# Patient Record
Sex: FEMALE | Race: WHITE | NOT HISPANIC OR LATINO
[De-identification: names, ages, dates, MRNs, and addresses within clinical notes are randomized per-mention and may not be internally consistent; named-entity substitution may affect disease eponyms.]

---

## 2018-04-01 ENCOUNTER — MOBILE ON CALL (OUTPATIENT)
Age: 28
End: 2018-04-01

## 2018-04-03 ENCOUNTER — APPOINTMENT (OUTPATIENT)
Dept: PULMONOLOGY | Facility: CLINIC | Age: 28
End: 2018-04-03
Payer: MEDICAID

## 2018-04-03 VITALS
DIASTOLIC BLOOD PRESSURE: 70 MMHG | OXYGEN SATURATION: 96 % | SYSTOLIC BLOOD PRESSURE: 110 MMHG | TEMPERATURE: 98.1 F | WEIGHT: 206 LBS | HEART RATE: 47 BPM | BODY MASS INDEX: 40.44 KG/M2 | RESPIRATION RATE: 15 BRPM | HEIGHT: 60 IN

## 2018-04-03 VITALS — BODY MASS INDEX: 41.23 KG/M2 | WEIGHT: 210 LBS | HEIGHT: 60 IN

## 2018-04-03 DIAGNOSIS — F17.200 NICOTINE DEPENDENCE, UNSPECIFIED, UNCOMPLICATED: ICD-10-CM

## 2018-04-03 DIAGNOSIS — J30.1 ALLERGIC RHINITIS DUE TO POLLEN: ICD-10-CM

## 2018-04-03 PROCEDURE — 94726 PLETHYSMOGRAPHY LUNG VOLUMES: CPT | Mod: GC

## 2018-04-03 PROCEDURE — ZZZZZ: CPT

## 2018-04-03 PROCEDURE — 99203 OFFICE O/P NEW LOW 30 MIN: CPT | Mod: 25,GC

## 2018-04-03 PROCEDURE — 94729 DIFFUSING CAPACITY: CPT | Mod: GC

## 2018-04-03 PROCEDURE — 94060 EVALUATION OF WHEEZING: CPT | Mod: GC

## 2018-04-03 RX ORDER — FLUTICASONE PROPIONATE AND SALMETEROL 50; 250 UG/1; UG/1
250-50 POWDER RESPIRATORY (INHALATION) TWICE DAILY
Qty: 1 | Refills: 5 | Status: DISCONTINUED | COMMUNITY
Start: 2018-04-03 | End: 2018-04-03

## 2018-04-03 RX ORDER — CETIRIZINE HYDROCHLORIDE 10 MG/1
10 CAPSULE, LIQUID FILLED ORAL
Refills: 0 | Status: ACTIVE | COMMUNITY
Start: 2018-04-03

## 2018-05-07 ENCOUNTER — RX RENEWAL (OUTPATIENT)
Age: 28
End: 2018-05-07

## 2018-06-03 ENCOUNTER — RX RENEWAL (OUTPATIENT)
Age: 28
End: 2018-06-03

## 2018-08-17 ENCOUNTER — APPOINTMENT (OUTPATIENT)
Dept: PULMONOLOGY | Facility: CLINIC | Age: 28
End: 2018-08-17

## 2018-09-28 ENCOUNTER — MEDICATION RENEWAL (OUTPATIENT)
Age: 28
End: 2018-09-28

## 2018-10-16 ENCOUNTER — APPOINTMENT (OUTPATIENT)
Dept: PULMONOLOGY | Facility: CLINIC | Age: 28
End: 2018-10-16
Payer: COMMERCIAL

## 2018-10-16 VITALS
RESPIRATION RATE: 16 BRPM | TEMPERATURE: 98.3 F | BODY MASS INDEX: 37.3 KG/M2 | SYSTOLIC BLOOD PRESSURE: 119 MMHG | HEIGHT: 60 IN | DIASTOLIC BLOOD PRESSURE: 74 MMHG | OXYGEN SATURATION: 96 % | HEART RATE: 81 BPM | WEIGHT: 190 LBS

## 2018-10-16 DIAGNOSIS — R06.02 SHORTNESS OF BREATH: ICD-10-CM

## 2018-10-16 PROCEDURE — 99214 OFFICE O/P EST MOD 30 MIN: CPT | Mod: GC

## 2018-12-31 ENCOUNTER — RX RENEWAL (OUTPATIENT)
Age: 28
End: 2018-12-31

## 2019-03-30 NOTE — CONSULT LETTER
[Hebert Sandoval M.D.] : Hebert Sandoval M.D. [Danie Leonard Professor of Medicine] : Danie Butcher of Medicine [[facility] [employer] [facility]] : [facility] [Division of Pulmonary, Critical Care and Sleep Medicine] : Division of Pulmonary, Critical Care and Sleep Medicine

## 2019-04-01 ENCOUNTER — APPOINTMENT (OUTPATIENT)
Dept: PULMONOLOGY | Facility: CLINIC | Age: 29
End: 2019-04-01
Payer: MEDICAID

## 2019-04-01 VITALS
BODY MASS INDEX: 37.3 KG/M2 | SYSTOLIC BLOOD PRESSURE: 110 MMHG | HEIGHT: 60 IN | WEIGHT: 190 LBS | DIASTOLIC BLOOD PRESSURE: 70 MMHG | RESPIRATION RATE: 16 BRPM | TEMPERATURE: 98 F | HEART RATE: 94 BPM | OXYGEN SATURATION: 100 %

## 2019-04-01 DIAGNOSIS — E66.9 OBESITY, UNSPECIFIED: ICD-10-CM

## 2019-04-01 DIAGNOSIS — J45.909 UNSPECIFIED ASTHMA, UNCOMPLICATED: ICD-10-CM

## 2019-04-01 PROCEDURE — 99213 OFFICE O/P EST LOW 20 MIN: CPT

## 2019-04-01 NOTE — HISTORY OF PRESENT ILLNESS
[FreeTextEntry1] : 27yo female patient current smoker with asthma here for f/u.  Patient currently maintained on Symbicort 160-4.5 and PRN Ventolin.  She knows her asthma triggers, mainly to be environmental, and so she takes Zyrtec during the spring time.  She has remained well in the interim, and today denies respiratory symptoms.  Unfortunately she continues to smoke, but admits that she has cut down on the amount of daily cigarettes.\par \par

## 2019-04-01 NOTE — DISCUSSION/SUMMARY
[FreeTextEntry1] : 27yo female patient current smoker with asthma here for f/u.  Patient's lung functions evidence of obstructive airways disease with bronchodilator response.  Her asthma appears to be controlled at this time.  She will c/w her Symbicort 160-4.5 BID and Ventolin PRN.  Reinforced daily peak flow monitoring (goal 300 to 450) to monitor asthma control.  Strongly encouraged smoking cessation.  Increase physical activity as tolerated.  F/u in 6 months, if not sooner.

## 2019-04-01 NOTE — PHYSICAL EXAM
[General Appearance - In No Acute Distress] : no acute distress [Normal Conjunctiva] : the conjunctiva exhibited no abnormalities [Normal Oropharynx] : normal oropharynx [III] : III [Heart Rate And Rhythm] : heart rate and rhythm were normal [Heart Sounds] : normal S1 and S2 [Arterial Pulses Normal] : the arterial pulses were normal [Respiration, Rhythm And Depth] : normal respiratory rhythm and effort [Abnormal Walk] : normal gait [Nail Clubbing] : no clubbing of the fingernails [Cyanosis, Localized] : no localized cyanosis [No Focal Deficits] : no focal deficits [Skin Color & Pigmentation] : normal skin color and pigmentation [Normal Appearance] : normal appearance [Neck Appearance] : the appearance of the neck was normal [Exaggerated Use Of Accessory Muscles For Inspiration] : no accessory muscle use [Auscultation Breath Sounds / Voice Sounds] : lungs were clear to auscultation bilaterally [] : no rash [Affect] : the affect was normal [Mood] : the mood was normal [FreeTextEntry2] : none

## 2019-04-01 NOTE — REVIEW OF SYSTEMS
[Negative] : Sleep Disorder [Orthopnea] : no orthopnea [Urgency] : no feelings of urinary urgency [Anxiety] : no anxiety [de-identified] : seasonal allergies

## 2019-05-15 ENCOUNTER — RX RENEWAL (OUTPATIENT)
Age: 29
End: 2019-05-15

## 2019-08-19 ENCOUNTER — MEDICATION RENEWAL (OUTPATIENT)
Age: 29
End: 2019-08-19

## 2019-10-14 ENCOUNTER — APPOINTMENT (OUTPATIENT)
Dept: PULMONOLOGY | Facility: CLINIC | Age: 29
End: 2019-10-14

## 2020-04-21 RX ORDER — FLUTICASONE FUROATE AND VILANTEROL TRIFENATATE 200; 25 UG/1; UG/1
200-25 POWDER RESPIRATORY (INHALATION)
Qty: 1 | Refills: 11 | Status: DISCONTINUED | COMMUNITY
Start: 2020-03-10 | End: 2020-04-21

## 2020-04-21 RX ORDER — BUDESONIDE AND FORMOTEROL FUMARATE DIHYDRATE 160; 4.5 UG/1; UG/1
160-4.5 AEROSOL RESPIRATORY (INHALATION)
Qty: 10.2 | Refills: 6 | Status: DISCONTINUED | COMMUNITY
Start: 2018-04-03 | End: 2020-04-21

## 2020-05-26 RX ORDER — FLUTICASONE PROPIONATE AND SALMETEROL 250; 50 UG/1; UG/1
250-50 POWDER RESPIRATORY (INHALATION) TWICE DAILY
Qty: 1 | Refills: 11 | Status: DISCONTINUED | COMMUNITY
Start: 2020-04-21 | End: 2020-05-26

## 2021-06-01 ENCOUNTER — NON-APPOINTMENT (OUTPATIENT)
Age: 31
End: 2021-06-01

## 2021-06-29 ENCOUNTER — NON-APPOINTMENT (OUTPATIENT)
Age: 31
End: 2021-06-29

## 2024-02-02 ENCOUNTER — APPOINTMENT (OUTPATIENT)
Dept: OBGYN | Facility: CLINIC | Age: 34
End: 2024-02-02
Payer: MEDICAID

## 2024-02-02 VITALS
BODY MASS INDEX: 37.76 KG/M2 | SYSTOLIC BLOOD PRESSURE: 130 MMHG | WEIGHT: 200 LBS | DIASTOLIC BLOOD PRESSURE: 70 MMHG | HEIGHT: 61 IN

## 2024-02-02 DIAGNOSIS — Z78.9 OTHER SPECIFIED HEALTH STATUS: ICD-10-CM

## 2024-02-02 DIAGNOSIS — Z82.49 FAMILY HISTORY OF ISCHEMIC HEART DISEASE AND OTHER DISEASES OF THE CIRCULATORY SYSTEM: ICD-10-CM

## 2024-02-02 DIAGNOSIS — Z80.0 FAMILY HISTORY OF MALIGNANT NEOPLASM OF DIGESTIVE ORGANS: ICD-10-CM

## 2024-02-02 DIAGNOSIS — Z3A.10 10 WEEKS GESTATION OF PREGNANCY: ICD-10-CM

## 2024-02-02 DIAGNOSIS — O34.219 MATERNAL CARE FOR UNSPECIFIED TYPE SCAR FROM PREVIOUS CESAREAN DELIVERY: ICD-10-CM

## 2024-02-02 DIAGNOSIS — O02.1 MISSED ABORTION: ICD-10-CM

## 2024-02-02 DIAGNOSIS — O36.80X0 PREGNANCY WITH INCONCLUSIVE FETAL VIABILITY, NOT APPLICABLE OR UNSPECIFIED: ICD-10-CM

## 2024-02-02 DIAGNOSIS — Z83.3 FAMILY HISTORY OF DIABETES MELLITUS: ICD-10-CM

## 2024-02-02 PROCEDURE — 76801 OB US < 14 WKS SINGLE FETUS: CPT

## 2024-02-02 PROCEDURE — 0501F PRENATAL FLOW SHEET: CPT

## 2024-02-02 RX ORDER — ASCORBIC ACID, CHOLECALCIFEROL, .ALPHA.-TOCOPHEROL ACETATE, DL-, PYRIDOXINE, FOLIC ACID, CYANOCOBALAMIN, CALCIUM, FERROUS FUMARATE, MAGNESIUM, DOCONEXENT 85; 200; 10; 25; 1; 12; 140; 27; 45; 300 [IU]/1; [IU]/1; [IU]/1; [IU]/1; MG/1; UG/1; MG/1; MG/1; MG/1; MG/1
27-0.6-0.4-3 CAPSULE, GELATIN COATED ORAL
Qty: 1 | Refills: 11 | Status: ACTIVE | COMMUNITY
Start: 2024-02-02 | End: 1900-01-01

## 2024-02-02 RX ORDER — FLUTICASONE FUROATE AND VILANTEROL TRIFENATATE 200; 25 UG/1; UG/1
200-25 POWDER RESPIRATORY (INHALATION)
Qty: 1 | Refills: 11 | Status: DISCONTINUED | COMMUNITY
Start: 2020-04-20 | End: 2024-02-02

## 2024-02-02 RX ORDER — ALBUTEROL SULFATE 90 UG/1
108 (90 BASE) AEROSOL, METERED RESPIRATORY (INHALATION)
Qty: 1 | Refills: 5 | Status: DISCONTINUED | COMMUNITY
Start: 2018-04-03 | End: 2024-02-02

## 2024-02-02 RX ORDER — BUDESONIDE AND FORMOTEROL FUMARATE DIHYDRATE 160; 4.5 UG/1; UG/1
160-4.5 AEROSOL RESPIRATORY (INHALATION)
Qty: 3 | Refills: 1 | Status: ACTIVE | COMMUNITY
Start: 2020-05-26 | End: 1900-01-01

## 2024-02-02 RX ORDER — ALBUTEROL SULFATE 2.5 MG/3ML
(2.5 MG/3ML) SOLUTION RESPIRATORY (INHALATION)
Qty: 30 | Refills: 2 | Status: ACTIVE | COMMUNITY
Start: 2018-04-03

## 2024-02-02 RX ORDER — ALBUTEROL SULFATE 90 UG/1
108 (90 BASE) INHALANT RESPIRATORY (INHALATION)
Qty: 3 | Refills: 6 | Status: ACTIVE | COMMUNITY
Start: 2019-05-15 | End: 1900-01-01

## 2024-02-03 LAB
BASOPHILS # BLD AUTO: 0.03 K/UL
BASOPHILS NFR BLD AUTO: 0.6 %
BILIRUB UR QL STRIP: NORMAL
EOSINOPHIL # BLD AUTO: 0.11 K/UL
EOSINOPHIL NFR BLD AUTO: 2.2 %
GLUCOSE UR-MCNC: NORMAL
HCG UR QL: 0.2 EU/DL
HCT VFR BLD CALC: 41.1 %
HGB BLD-MCNC: 13.2 G/DL
HGB UR QL STRIP.AUTO: NORMAL
HIV1+2 AB SPEC QL IA.RAPID: NONREACTIVE
IMM GRANULOCYTES NFR BLD AUTO: 0.4 %
KETONES UR-MCNC: NORMAL
LEUKOCYTE ESTERASE UR QL STRIP: NORMAL
LYMPHOCYTES # BLD AUTO: 1.45 K/UL
LYMPHOCYTES NFR BLD AUTO: 28.8 %
MAN DIFF?: NORMAL
MCHC RBC-ENTMCNC: 29.3 PG
MCHC RBC-ENTMCNC: 32.1 GM/DL
MCV RBC AUTO: 91.3 FL
MONOCYTES # BLD AUTO: 0.46 K/UL
MONOCYTES NFR BLD AUTO: 9.1 %
NEUTROPHILS # BLD AUTO: 2.97 K/UL
NEUTROPHILS NFR BLD AUTO: 58.9 %
NITRITE UR QL STRIP: NORMAL
PH UR STRIP: 7
PLATELET # BLD AUTO: 239 K/UL
PROT UR STRIP-MCNC: NORMAL
RBC # BLD: 4.5 M/UL
RBC # FLD: 15.1 %
SP GR UR STRIP: 1
TSH SERPL-ACNC: 3.44 UIU/ML
WBC # FLD AUTO: 5.04 K/UL

## 2024-02-05 LAB
ABO + RH PNL BLD: NORMAL
BACTERIA UR CULT: NORMAL
BLD GP AB SCN SERPL QL: NORMAL
C TRACH RRNA SPEC QL NAA+PROBE: NOT DETECTED
HBV SURFACE AG SERPL QL IA: NONREACTIVE
HCV AB SER QL: NONREACTIVE
HCV S/CO RATIO: 0.36 S/CO
MEV IGG FLD QL IA: 138 AU/ML
MEV IGG+IGM SER-IMP: POSITIVE
N GONORRHOEA RRNA SPEC QL NAA+PROBE: NOT DETECTED
RUBV IGG FLD-ACNC: 2 INDEX
RUBV IGG SER-IMP: POSITIVE
SOURCE AMPLIFICATION: NORMAL
T PALLIDUM AB SER QL IA: NEGATIVE
VZV AB TITR SER: POSITIVE
VZV IGG SER IF-ACNC: 835.9 INDEX

## 2024-02-06 LAB
B19V IGG SER QL IA: 0.21 INDEX
B19V IGG+IGM SER-IMP: NEGATIVE
B19V IGG+IGM SER-IMP: NORMAL
B19V IGM FLD-ACNC: 0.15 INDEX
B19V IGM SER-ACNC: NEGATIVE
HGB A MFR BLD: 97.3 %
HGB A2 MFR BLD: 2.7 %
HGB FRACT BLD-IMP: NORMAL
LEAD BLD-MCNC: <1 UG/DL
SOURCE AMPLIFICATION: NORMAL
T VAGINALIS RRNA SPEC QL NAA+PROBE: NOT DETECTED

## 2024-02-16 ENCOUNTER — NON-APPOINTMENT (OUTPATIENT)
Age: 34
End: 2024-02-16

## 2024-02-22 ENCOUNTER — NON-APPOINTMENT (OUTPATIENT)
Age: 34
End: 2024-02-22

## 2024-02-23 ENCOUNTER — APPOINTMENT (OUTPATIENT)
Dept: OBGYN | Facility: CLINIC | Age: 34
End: 2024-02-23
Payer: MEDICAID

## 2024-02-23 VITALS
DIASTOLIC BLOOD PRESSURE: 70 MMHG | BODY MASS INDEX: 38.33 KG/M2 | HEIGHT: 61 IN | SYSTOLIC BLOOD PRESSURE: 150 MMHG | WEIGHT: 203 LBS

## 2024-02-23 DIAGNOSIS — Z3A.13 13 WEEKS GESTATION OF PREGNANCY: ICD-10-CM

## 2024-02-23 DIAGNOSIS — O20.0 THREATENED ABORTION: ICD-10-CM

## 2024-02-23 LAB
BILIRUB UR QL STRIP: NORMAL
GLUCOSE UR-MCNC: NORMAL
HCG UR QL: 0.2 EU/DL
HGB UR QL STRIP.AUTO: NORMAL
KETONES UR-MCNC: NORMAL
LEUKOCYTE ESTERASE UR QL STRIP: NORMAL
NITRITE UR QL STRIP: NORMAL
PH UR STRIP: 5.5
PROT UR STRIP-MCNC: NORMAL
SP GR UR STRIP: 1.03

## 2024-02-23 PROCEDURE — 0502F SUBSEQUENT PRENATAL CARE: CPT

## 2024-02-23 PROCEDURE — 76815 OB US LIMITED FETUS(S): CPT

## 2024-02-29 ENCOUNTER — NON-APPOINTMENT (OUTPATIENT)
Age: 34
End: 2024-02-29

## 2024-03-19 ENCOUNTER — APPOINTMENT (OUTPATIENT)
Dept: OBGYN | Facility: CLINIC | Age: 34
End: 2024-03-19

## 2024-03-22 ENCOUNTER — APPOINTMENT (OUTPATIENT)
Dept: OBGYN | Facility: CLINIC | Age: 34
End: 2024-03-22
Payer: MEDICAID

## 2024-03-22 VITALS
BODY MASS INDEX: 38.71 KG/M2 | WEIGHT: 205 LBS | DIASTOLIC BLOOD PRESSURE: 70 MMHG | HEIGHT: 61 IN | SYSTOLIC BLOOD PRESSURE: 126 MMHG

## 2024-03-22 DIAGNOSIS — E66.01 MORBID (SEVERE) OBESITY DUE TO EXCESS CALORIES: ICD-10-CM

## 2024-03-22 DIAGNOSIS — N87.0 MILD CERVICAL DYSPLASIA: ICD-10-CM

## 2024-03-22 DIAGNOSIS — Z3A.17 17 WEEKS GESTATION OF PREGNANCY: ICD-10-CM

## 2024-03-22 DIAGNOSIS — Z92.89 PERSONAL HISTORY OF OTHER MEDICAL TREATMENT: ICD-10-CM

## 2024-03-22 LAB
BILIRUB UR QL STRIP: NORMAL
GLUCOSE UR-MCNC: NORMAL
HCG UR QL: 0.2 EU/DL
HGB UR QL STRIP.AUTO: NORMAL
KETONES UR-MCNC: NORMAL
LEUKOCYTE ESTERASE UR QL STRIP: NORMAL
NITRITE UR QL STRIP: NORMAL
PH UR STRIP: 6.5
PROT UR STRIP-MCNC: NORMAL
SP GR UR STRIP: 1.01

## 2024-03-22 PROCEDURE — 0502F SUBSEQUENT PRENATAL CARE: CPT

## 2024-03-25 LAB
AFP MOM: 1.51
AFP VALUE: 50.8 NG/ML
ALPHA FETOPROTEIN SERUM COMMENT: NORMAL
ALPHA FETOPROTEIN SERUM INTERPRETATION: NORMAL
ALPHA FETOPROTEIN SERUM RESULTS: NORMAL
ALPHA FETOPROTEIN SERUM TEST RESULTS: NORMAL
GESTATIONAL AGE BASED ON: NORMAL
GESTATIONAL AGE ON COLLECTION DATE: 17.4 WEEKS
INSULIN DEP DIABETES: NO
MATERNAL AGE AT EDD AFP: 33.9 YR
MULTIPLE GESTATION: NO
OSBR RISK 1 IN: 2661
RACE: NORMAL
WEIGHT AFP: 205 LBS

## 2024-03-27 LAB — CYTOLOGY CVX/VAG DOC THIN PREP: NORMAL

## 2024-04-02 ENCOUNTER — NON-APPOINTMENT (OUTPATIENT)
Age: 34
End: 2024-04-02

## 2024-04-16 ENCOUNTER — APPOINTMENT (OUTPATIENT)
Dept: ANTEPARTUM | Facility: CLINIC | Age: 34
End: 2024-04-16

## 2024-04-23 ENCOUNTER — APPOINTMENT (OUTPATIENT)
Dept: OBGYN | Facility: CLINIC | Age: 34
End: 2024-04-23

## 2024-04-24 ENCOUNTER — ASOB RESULT (OUTPATIENT)
Age: 34
End: 2024-04-24

## 2024-04-24 ENCOUNTER — APPOINTMENT (OUTPATIENT)
Dept: ANTEPARTUM | Facility: CLINIC | Age: 34
End: 2024-04-24

## 2024-04-24 PROCEDURE — 76811 OB US DETAILED SNGL FETUS: CPT | Mod: 1L

## 2024-04-24 PROCEDURE — 76817 TRANSVAGINAL US OBSTETRIC: CPT

## 2024-05-01 ENCOUNTER — NON-APPOINTMENT (OUTPATIENT)
Age: 34
End: 2024-05-01

## 2024-05-01 ENCOUNTER — APPOINTMENT (OUTPATIENT)
Dept: ANTEPARTUM | Facility: CLINIC | Age: 34
End: 2024-05-01
Payer: MEDICAID

## 2024-05-01 ENCOUNTER — ASOB RESULT (OUTPATIENT)
Age: 34
End: 2024-05-01

## 2024-05-01 PROCEDURE — 76816 OB US FOLLOW-UP PER FETUS: CPT

## 2024-05-07 ENCOUNTER — APPOINTMENT (OUTPATIENT)
Dept: OBGYN | Facility: CLINIC | Age: 34
End: 2024-05-07

## 2024-05-14 ENCOUNTER — APPOINTMENT (OUTPATIENT)
Dept: OBGYN | Facility: CLINIC | Age: 34
End: 2024-05-14
Payer: MEDICAID

## 2024-05-14 VITALS
WEIGHT: 209 LBS | BODY MASS INDEX: 39.46 KG/M2 | HEIGHT: 61 IN | SYSTOLIC BLOOD PRESSURE: 130 MMHG | DIASTOLIC BLOOD PRESSURE: 80 MMHG

## 2024-05-14 DIAGNOSIS — O99.210 OBESITY COMPLICATING PREGNANCY, UNSPECIFIED TRIMESTER: ICD-10-CM

## 2024-05-14 DIAGNOSIS — Z3A.25 25 WEEKS GESTATION OF PREGNANCY: ICD-10-CM

## 2024-05-14 LAB
BILIRUB UR QL STRIP: NORMAL
GLUCOSE UR-MCNC: NORMAL
HCG UR QL: 0.2 EU/DL
HGB UR QL STRIP.AUTO: NORMAL
KETONES UR-MCNC: NORMAL
LEUKOCYTE ESTERASE UR QL STRIP: NORMAL
NITRITE UR QL STRIP: NORMAL
PH UR STRIP: 7
PROT UR STRIP-MCNC: NORMAL
SP GR UR STRIP: 1.01

## 2024-05-14 PROCEDURE — 0502F SUBSEQUENT PRENATAL CARE: CPT

## 2024-05-15 ENCOUNTER — NON-APPOINTMENT (OUTPATIENT)
Age: 34
End: 2024-05-15

## 2024-05-15 LAB
BASOPHILS # BLD AUTO: 0.04 K/UL
BASOPHILS NFR BLD AUTO: 0.4 %
EOSINOPHIL # BLD AUTO: 0.1 K/UL
EOSINOPHIL NFR BLD AUTO: 0.9 %
GLUCOSE 1H P 50 G GLC PO SERPL-MCNC: 126 MG/DL
HCT VFR BLD CALC: 36.4 %
HGB BLD-MCNC: 11.7 G/DL
IMM GRANULOCYTES NFR BLD AUTO: 0.7 %
LYMPHOCYTES # BLD AUTO: 1.6 K/UL
LYMPHOCYTES NFR BLD AUTO: 14.6 %
MAN DIFF?: NORMAL
MCHC RBC-ENTMCNC: 30.6 PG
MCHC RBC-ENTMCNC: 32.1 GM/DL
MCV RBC AUTO: 95.3 FL
MONOCYTES # BLD AUTO: 0.68 K/UL
MONOCYTES NFR BLD AUTO: 6.2 %
NEUTROPHILS # BLD AUTO: 8.45 K/UL
NEUTROPHILS NFR BLD AUTO: 77.2 %
PLATELET # BLD AUTO: 292 K/UL
RBC # BLD: 3.82 M/UL
RBC # FLD: 15.9 %
WBC # FLD AUTO: 10.95 K/UL

## 2024-06-21 ENCOUNTER — NON-APPOINTMENT (OUTPATIENT)
Age: 34
End: 2024-06-21

## 2024-06-26 ENCOUNTER — ASOB RESULT (OUTPATIENT)
Age: 34
End: 2024-06-26

## 2024-06-26 ENCOUNTER — APPOINTMENT (OUTPATIENT)
Dept: ANTEPARTUM | Facility: CLINIC | Age: 34
End: 2024-06-26
Payer: MEDICAID

## 2024-06-26 PROCEDURE — 76816 OB US FOLLOW-UP PER FETUS: CPT

## 2024-06-26 PROCEDURE — 76819 FETAL BIOPHYS PROFIL W/O NST: CPT

## 2024-07-19 ENCOUNTER — APPOINTMENT (OUTPATIENT)
Dept: OBGYN | Facility: CLINIC | Age: 34
End: 2024-07-19
Payer: MEDICAID

## 2024-07-19 VITALS
SYSTOLIC BLOOD PRESSURE: 118 MMHG | BODY MASS INDEX: 41.16 KG/M2 | WEIGHT: 218 LBS | DIASTOLIC BLOOD PRESSURE: 72 MMHG | HEIGHT: 61 IN

## 2024-07-19 DIAGNOSIS — O34.219 MATERNAL CARE FOR UNSPECIFIED TYPE SCAR FROM PREVIOUS CESAREAN DELIVERY: ICD-10-CM

## 2024-07-19 DIAGNOSIS — Z3A.34 34 WEEKS GESTATION OF PREGNANCY: ICD-10-CM

## 2024-07-19 DIAGNOSIS — O99.210 OBESITY COMPLICATING PREGNANCY, UNSPECIFIED TRIMESTER: ICD-10-CM

## 2024-07-19 PROCEDURE — 0502F SUBSEQUENT PRENATAL CARE: CPT

## 2024-07-31 ENCOUNTER — APPOINTMENT (OUTPATIENT)
Dept: ANTEPARTUM | Facility: CLINIC | Age: 34
End: 2024-07-31

## 2024-08-02 ENCOUNTER — APPOINTMENT (OUTPATIENT)
Dept: OBGYN | Facility: CLINIC | Age: 34
End: 2024-08-02

## 2024-08-02 VITALS
DIASTOLIC BLOOD PRESSURE: 76 MMHG | BODY MASS INDEX: 41.54 KG/M2 | WEIGHT: 220 LBS | SYSTOLIC BLOOD PRESSURE: 130 MMHG | HEIGHT: 61 IN

## 2024-08-02 DIAGNOSIS — Z3A.36 36 WEEKS GESTATION OF PREGNANCY: ICD-10-CM

## 2024-08-02 PROCEDURE — 0502F SUBSEQUENT PRENATAL CARE: CPT

## 2024-08-02 PROCEDURE — 76816 OB US FOLLOW-UP PER FETUS: CPT

## 2024-08-04 LAB
BASOPHILS # BLD AUTO: 0.03 K/UL
BASOPHILS NFR BLD AUTO: 0.3 %
EOSINOPHIL # BLD AUTO: 0.09 K/UL
EOSINOPHIL NFR BLD AUTO: 0.8 %
GP B STREP DNA SPEC QL NAA+PROBE: NOT DETECTED
HCT VFR BLD CALC: 40 %
HGB BLD-MCNC: 12.6 G/DL
HIV1+2 AB SPEC QL IA.RAPID: NONREACTIVE
IMM GRANULOCYTES NFR BLD AUTO: 0.8 %
LYMPHOCYTES # BLD AUTO: 1.56 K/UL
LYMPHOCYTES NFR BLD AUTO: 13.7 %
MAN DIFF?: NORMAL
MCHC RBC-ENTMCNC: 30.1 PG
MCHC RBC-ENTMCNC: 31.5 GM/DL
MCV RBC AUTO: 95.7 FL
MONOCYTES # BLD AUTO: 0.85 K/UL
MONOCYTES NFR BLD AUTO: 7.5 %
NEUTROPHILS # BLD AUTO: 8.74 K/UL
NEUTROPHILS NFR BLD AUTO: 76.9 %
PLATELET # BLD AUTO: 257 K/UL
RBC # BLD: 4.18 M/UL
RBC # FLD: 15.9 %
SOURCE GBS: NORMAL
T PALLIDUM AB SER QL IA: NEGATIVE
WBC # FLD AUTO: 11.36 K/UL

## 2024-08-05 ENCOUNTER — NON-APPOINTMENT (OUTPATIENT)
Age: 34
End: 2024-08-05

## 2024-08-09 ENCOUNTER — APPOINTMENT (OUTPATIENT)
Dept: OBGYN | Facility: CLINIC | Age: 34
End: 2024-08-09

## 2024-08-09 PROBLEM — Z3A.37 37 WEEKS GESTATION OF PREGNANCY: Status: ACTIVE | Noted: 2024-08-09

## 2024-08-09 PROCEDURE — 0502F SUBSEQUENT PRENATAL CARE: CPT

## 2024-08-13 ENCOUNTER — NON-APPOINTMENT (OUTPATIENT)
Age: 34
End: 2024-08-13

## 2024-08-16 ENCOUNTER — APPOINTMENT (OUTPATIENT)
Dept: OBGYN | Facility: CLINIC | Age: 34
End: 2024-08-16

## 2024-08-16 VITALS
WEIGHT: 229 LBS | HEIGHT: 61 IN | SYSTOLIC BLOOD PRESSURE: 136 MMHG | DIASTOLIC BLOOD PRESSURE: 80 MMHG | BODY MASS INDEX: 43.23 KG/M2

## 2024-08-16 DIAGNOSIS — O99.210 OBESITY COMPLICATING PREGNANCY, UNSPECIFIED TRIMESTER: ICD-10-CM

## 2024-08-16 DIAGNOSIS — Z3A.38 38 WEEKS GESTATION OF PREGNANCY: ICD-10-CM

## 2024-08-16 DIAGNOSIS — O34.219 MATERNAL CARE FOR UNSPECIFIED TYPE SCAR FROM PREVIOUS CESAREAN DELIVERY: ICD-10-CM

## 2024-08-16 PROCEDURE — 76818 FETAL BIOPHYS PROFILE W/NST: CPT

## 2024-08-16 PROCEDURE — 0502F SUBSEQUENT PRENATAL CARE: CPT

## 2024-08-20 ENCOUNTER — TRANSCRIPTION ENCOUNTER (OUTPATIENT)
Age: 34
End: 2024-08-20

## 2024-08-21 ENCOUNTER — INPATIENT (INPATIENT)
Facility: HOSPITAL | Age: 34
LOS: 1 days | Discharge: HOME | End: 2024-08-23
Attending: OBSTETRICS & GYNECOLOGY | Admitting: OBSTETRICS & GYNECOLOGY
Payer: MEDICAID

## 2024-08-21 VITALS — HEART RATE: 118 BPM | DIASTOLIC BLOOD PRESSURE: 91 MMHG | SYSTOLIC BLOOD PRESSURE: 153 MMHG

## 2024-08-21 DIAGNOSIS — Z98.891 HISTORY OF UTERINE SCAR FROM PREVIOUS SURGERY: ICD-10-CM

## 2024-08-21 DIAGNOSIS — Z90.89 ACQUIRED ABSENCE OF OTHER ORGANS: Chronic | ICD-10-CM

## 2024-08-21 DIAGNOSIS — K60.2 ANAL FISSURE, UNSPECIFIED: Chronic | ICD-10-CM

## 2024-08-21 LAB
ALBUMIN SERPL ELPH-MCNC: 3.4 G/DL — SIGNIFICANT CHANGE UP (ref 3.3–5)
ALP SERPL-CCNC: 134 U/L — HIGH (ref 40–120)
ALT FLD-CCNC: 27 U/L — SIGNIFICANT CHANGE UP (ref 4–33)
ANION GAP SERPL CALC-SCNC: 18 MMOL/L — HIGH (ref 7–14)
APPEARANCE UR: CLEAR — SIGNIFICANT CHANGE UP
AST SERPL-CCNC: 26 U/L — SIGNIFICANT CHANGE UP (ref 4–32)
BACTERIA # UR AUTO: ABNORMAL /HPF
BASOPHILS # BLD AUTO: 0.05 K/UL — SIGNIFICANT CHANGE UP (ref 0–0.2)
BASOPHILS NFR BLD AUTO: 0.4 % — SIGNIFICANT CHANGE UP (ref 0–2)
BILIRUB SERPL-MCNC: <0.2 MG/DL — SIGNIFICANT CHANGE UP (ref 0.2–1.2)
BILIRUB UR-MCNC: NEGATIVE — SIGNIFICANT CHANGE UP
BLD GP AB SCN SERPL QL: NEGATIVE — SIGNIFICANT CHANGE UP
BUN SERPL-MCNC: 12 MG/DL — SIGNIFICANT CHANGE UP (ref 7–23)
CALCIUM SERPL-MCNC: 10.2 MG/DL — SIGNIFICANT CHANGE UP (ref 8.4–10.5)
CAST: 0 /LPF — SIGNIFICANT CHANGE UP (ref 0–4)
CHLORIDE SERPL-SCNC: 103 MMOL/L — SIGNIFICANT CHANGE UP (ref 98–107)
CO2 SERPL-SCNC: 19 MMOL/L — LOW (ref 22–31)
COLOR SPEC: YELLOW — SIGNIFICANT CHANGE UP
CREAT ?TM UR-MCNC: 56 MG/DL — SIGNIFICANT CHANGE UP
CREAT SERPL-MCNC: 0.62 MG/DL — SIGNIFICANT CHANGE UP (ref 0.5–1.3)
DIFF PNL FLD: NEGATIVE — SIGNIFICANT CHANGE UP
EGFR: 121 ML/MIN/1.73M2 — SIGNIFICANT CHANGE UP
EOSINOPHIL # BLD AUTO: 0.1 K/UL — SIGNIFICANT CHANGE UP (ref 0–0.5)
EOSINOPHIL NFR BLD AUTO: 0.8 % — SIGNIFICANT CHANGE UP (ref 0–6)
GLUCOSE SERPL-MCNC: 105 MG/DL — HIGH (ref 70–99)
GLUCOSE UR QL: NEGATIVE MG/DL — SIGNIFICANT CHANGE UP
HCT VFR BLD CALC: 38 % — SIGNIFICANT CHANGE UP (ref 34.5–45)
HGB BLD-MCNC: 12.7 G/DL — SIGNIFICANT CHANGE UP (ref 11.5–15.5)
IANC: 8.8 K/UL — HIGH (ref 1.8–7.4)
IMM GRANULOCYTES NFR BLD AUTO: 2.6 % — HIGH (ref 0–0.9)
KETONES UR-MCNC: ABNORMAL MG/DL
LDH SERPL L TO P-CCNC: 199 U/L — SIGNIFICANT CHANGE UP (ref 135–225)
LEUKOCYTE ESTERASE UR-ACNC: NEGATIVE — SIGNIFICANT CHANGE UP
LYMPHOCYTES # BLD AUTO: 1.76 K/UL — SIGNIFICANT CHANGE UP (ref 1–3.3)
LYMPHOCYTES # BLD AUTO: 14.9 % — SIGNIFICANT CHANGE UP (ref 13–44)
MCHC RBC-ENTMCNC: 30.6 PG — SIGNIFICANT CHANGE UP (ref 27–34)
MCHC RBC-ENTMCNC: 33.4 GM/DL — SIGNIFICANT CHANGE UP (ref 32–36)
MCV RBC AUTO: 91.6 FL — SIGNIFICANT CHANGE UP (ref 80–100)
MONOCYTES # BLD AUTO: 0.76 K/UL — SIGNIFICANT CHANGE UP (ref 0–0.9)
MONOCYTES NFR BLD AUTO: 6.5 % — SIGNIFICANT CHANGE UP (ref 2–14)
NEUTROPHILS # BLD AUTO: 8.8 K/UL — HIGH (ref 1.8–7.4)
NEUTROPHILS NFR BLD AUTO: 74.8 % — SIGNIFICANT CHANGE UP (ref 43–77)
NITRITE UR-MCNC: NEGATIVE — SIGNIFICANT CHANGE UP
NRBC # BLD: 0 /100 WBCS — SIGNIFICANT CHANGE UP (ref 0–0)
NRBC # FLD: 0 K/UL — SIGNIFICANT CHANGE UP (ref 0–0)
PH UR: 7 — SIGNIFICANT CHANGE UP (ref 5–8)
PLATELET # BLD AUTO: 259 K/UL — SIGNIFICANT CHANGE UP (ref 150–400)
POTASSIUM SERPL-MCNC: 4.4 MMOL/L — SIGNIFICANT CHANGE UP (ref 3.5–5.3)
POTASSIUM SERPL-SCNC: 4.4 MMOL/L — SIGNIFICANT CHANGE UP (ref 3.5–5.3)
PROT ?TM UR-MCNC: 32 MG/DL — SIGNIFICANT CHANGE UP
PROT SERPL-MCNC: 6.7 G/DL — SIGNIFICANT CHANGE UP (ref 6–8.3)
PROT UR-MCNC: 30 MG/DL
PROT/CREAT UR-RTO: 0.6 RATIO — HIGH (ref 0–0.2)
RBC # BLD: 4.15 M/UL — SIGNIFICANT CHANGE UP (ref 3.8–5.2)
RBC # FLD: 15.5 % — HIGH (ref 10.3–14.5)
RBC CASTS # UR COMP ASSIST: 0 /HPF — SIGNIFICANT CHANGE UP (ref 0–4)
REVIEW: SIGNIFICANT CHANGE UP
RH IG SCN BLD-IMP: POSITIVE — SIGNIFICANT CHANGE UP
RH IG SCN BLD-IMP: POSITIVE — SIGNIFICANT CHANGE UP
SODIUM SERPL-SCNC: 140 MMOL/L — SIGNIFICANT CHANGE UP (ref 135–145)
SP GR SPEC: 1.01 — SIGNIFICANT CHANGE UP (ref 1–1.03)
SQUAMOUS # UR AUTO: 4 /HPF — SIGNIFICANT CHANGE UP (ref 0–5)
T PALLIDUM AB TITR SER: NEGATIVE — SIGNIFICANT CHANGE UP
URATE SERPL-MCNC: 6.6 MG/DL — SIGNIFICANT CHANGE UP (ref 2.5–7)
UROBILINOGEN FLD QL: 0.2 MG/DL — SIGNIFICANT CHANGE UP (ref 0.2–1)
WBC # BLD: 11.78 K/UL — HIGH (ref 3.8–10.5)
WBC # FLD AUTO: 11.78 K/UL — HIGH (ref 3.8–10.5)
WBC UR QL: 1 /HPF — SIGNIFICANT CHANGE UP (ref 0–5)

## 2024-08-21 PROCEDURE — 59025 FETAL NON-STRESS TEST: CPT | Mod: 26

## 2024-08-21 PROCEDURE — 59510 CESAREAN DELIVERY: CPT | Mod: U9

## 2024-08-21 PROCEDURE — 59514 CESAREAN DELIVERY ONLY: CPT | Mod: AS,U9

## 2024-08-21 DEVICE — INTERCEED 3 X 4": Type: IMPLANTABLE DEVICE | Status: FUNCTIONAL

## 2024-08-21 RX ORDER — HEPARIN SODIUM,BOVINE 1000/ML
5000 VIAL (ML) INJECTION EVERY 12 HOURS
Refills: 0 | Status: DISCONTINUED | OUTPATIENT
Start: 2024-08-21 | End: 2024-08-21

## 2024-08-21 RX ORDER — CHLORHEXIDINE GLUCONATE 40 MG/ML
1 SOLUTION TOPICAL DAILY
Refills: 0 | Status: DISCONTINUED | OUTPATIENT
Start: 2024-08-21 | End: 2024-08-22

## 2024-08-21 RX ORDER — DIPHENHYDRAMINE HCL 50 MG
25 CAPSULE ORAL EVERY 4 HOURS
Refills: 0 | Status: DISCONTINUED | OUTPATIENT
Start: 2024-08-21 | End: 2024-08-22

## 2024-08-21 RX ORDER — TETANUS TOXOID, REDUCED DIPHTHERIA TOXOID AND ACELLULAR PERTUSSIS VACCINE, ADSORBED 5; 2.5; 8; 8; 2.5 [IU]/.5ML; [IU]/.5ML; UG/.5ML; UG/.5ML; UG/.5ML
0.5 SUSPENSION INTRAMUSCULAR ONCE
Refills: 0 | Status: DISCONTINUED | OUTPATIENT
Start: 2024-08-21 | End: 2024-08-23

## 2024-08-21 RX ORDER — NALBUPHINE HYDROCHLORIDE 10 MG/ML
2.5 INJECTION, SOLUTION INTRAMUSCULAR; INTRAVENOUS; SUBCUTANEOUS EVERY 6 HOURS
Refills: 0 | Status: DISCONTINUED | OUTPATIENT
Start: 2024-08-21 | End: 2024-08-22

## 2024-08-21 RX ORDER — ACETAMINOPHEN 325 MG/1
1000 TABLET ORAL ONCE
Refills: 0 | Status: COMPLETED | OUTPATIENT
Start: 2024-08-21 | End: 2024-08-21

## 2024-08-21 RX ORDER — DEXAMETHASONE 0.75 MG
4 TABLET ORAL EVERY 6 HOURS
Refills: 0 | Status: DISCONTINUED | OUTPATIENT
Start: 2024-08-21 | End: 2024-08-22

## 2024-08-21 RX ORDER — OXYCODONE HYDROCHLORIDE 5 MG/1
5 TABLET ORAL
Refills: 0 | Status: COMPLETED | OUTPATIENT
Start: 2024-08-21 | End: 2024-08-28

## 2024-08-21 RX ORDER — SODIUM CITRATE AND CITRIC ACID MONOHYDRATE 334; 500 MG/5ML; MG/5ML
30 SOLUTION ORAL ONCE
Refills: 0 | Status: COMPLETED | OUTPATIENT
Start: 2024-08-21 | End: 2024-08-21

## 2024-08-21 RX ORDER — NALOXONE HCL 1 MG/ML
0.1 VIAL (ML) INJECTION
Refills: 0 | Status: DISCONTINUED | OUTPATIENT
Start: 2024-08-21 | End: 2024-08-22

## 2024-08-21 RX ORDER — OXYCODONE HYDROCHLORIDE 5 MG/1
5 TABLET ORAL ONCE
Refills: 0 | Status: DISCONTINUED | OUTPATIENT
Start: 2024-08-21 | End: 2024-08-23

## 2024-08-21 RX ORDER — ACETAMINOPHEN 325 MG/1
975 TABLET ORAL
Refills: 0 | Status: DISCONTINUED | OUTPATIENT
Start: 2024-08-21 | End: 2024-08-23

## 2024-08-21 RX ORDER — OXYTOCIN 10 UNIT/ML
333.33 AMPUL (ML) INJECTION
Qty: 20 | Refills: 0 | Status: DISCONTINUED | OUTPATIENT
Start: 2024-08-21 | End: 2024-08-23

## 2024-08-21 RX ORDER — BUTORPHANOL TARTRATE 2 MG/ML
0.25 VIAL (ML) INJECTION EVERY 6 HOURS
Refills: 0 | Status: DISCONTINUED | OUTPATIENT
Start: 2024-08-21 | End: 2024-08-22

## 2024-08-21 RX ORDER — HEPARIN SODIUM,BOVINE 1000/ML
10000 VIAL (ML) INJECTION EVERY 12 HOURS
Refills: 0 | Status: DISCONTINUED | OUTPATIENT
Start: 2024-08-21 | End: 2024-08-23

## 2024-08-21 RX ORDER — KETOROLAC TROMETHAMINE 30 MG/ML
30 INJECTION, SOLUTION INTRAMUSCULAR EVERY 6 HOURS
Refills: 0 | Status: DISCONTINUED | OUTPATIENT
Start: 2024-08-21 | End: 2024-08-22

## 2024-08-21 RX ORDER — OXYCODONE HYDROCHLORIDE 5 MG/1
5 TABLET ORAL
Refills: 0 | Status: DISCONTINUED | OUTPATIENT
Start: 2024-08-21 | End: 2024-08-22

## 2024-08-21 RX ORDER — LANOLIN
1 OINTMENT (GRAM) TOPICAL EVERY 6 HOURS
Refills: 0 | Status: DISCONTINUED | OUTPATIENT
Start: 2024-08-21 | End: 2024-08-23

## 2024-08-21 RX ORDER — BUDESONIDE AND FORMOTEROL FUMARATE 80; 4.5 UG/1; UG/1
2 AEROSOL, METERED RESPIRATORY (INHALATION)
Refills: 0 | Status: DISCONTINUED | OUTPATIENT
Start: 2024-08-22 | End: 2024-08-23

## 2024-08-21 RX ORDER — ONDANSETRON 2 MG/ML
4 INJECTION, SOLUTION INTRAMUSCULAR; INTRAVENOUS EVERY 6 HOURS
Refills: 0 | Status: DISCONTINUED | OUTPATIENT
Start: 2024-08-21 | End: 2024-08-22

## 2024-08-21 RX ORDER — IBUPROFEN 600 MG
600 TABLET ORAL EVERY 6 HOURS
Refills: 0 | Status: COMPLETED | OUTPATIENT
Start: 2024-08-21 | End: 2025-07-20

## 2024-08-21 RX ORDER — DIPHENHYDRAMINE HCL 50 MG
25 CAPSULE ORAL EVERY 6 HOURS
Refills: 0 | Status: DISCONTINUED | OUTPATIENT
Start: 2024-08-21 | End: 2024-08-23

## 2024-08-21 RX ORDER — FAMOTIDINE 10 MG/ML
20 INJECTION INTRAVENOUS ONCE
Refills: 0 | Status: COMPLETED | OUTPATIENT
Start: 2024-08-21 | End: 2024-08-21

## 2024-08-21 RX ADMIN — SODIUM CITRATE AND CITRIC ACID MONOHYDRATE 30 MILLILITER(S): 334; 500 SOLUTION ORAL at 08:22

## 2024-08-21 RX ADMIN — Medication 1000 MILLIUNIT(S)/MIN: at 19:50

## 2024-08-21 RX ADMIN — FAMOTIDINE 20 MILLIGRAM(S): 10 INJECTION INTRAVENOUS at 08:22

## 2024-08-21 RX ADMIN — ACETAMINOPHEN 1000 MILLIGRAM(S): 325 TABLET ORAL at 20:10

## 2024-08-21 RX ADMIN — Medication 4 MILLIGRAM(S): at 22:06

## 2024-08-21 RX ADMIN — Medication 1000 MILLILITER(S): at 17:45

## 2024-08-21 RX ADMIN — Medication 75 MILLILITER(S): at 19:50

## 2024-08-21 RX ADMIN — KETOROLAC TROMETHAMINE 30 MILLIGRAM(S): 30 INJECTION, SOLUTION INTRAMUSCULAR at 22:42

## 2024-08-21 RX ADMIN — ONDANSETRON 4 MILLIGRAM(S): 2 INJECTION, SOLUTION INTRAMUSCULAR; INTRAVENOUS at 19:29

## 2024-08-21 RX ADMIN — Medication 200 MILLILITER(S): at 08:21

## 2024-08-21 RX ADMIN — ACETAMINOPHEN 400 MILLIGRAM(S): 325 TABLET ORAL at 19:50

## 2024-08-21 RX ADMIN — CHLORHEXIDINE GLUCONATE 1 APPLICATION(S): 40 SOLUTION TOPICAL at 08:21

## 2024-08-21 RX ADMIN — KETOROLAC TROMETHAMINE 30 MILLIGRAM(S): 30 INJECTION, SOLUTION INTRAMUSCULAR at 23:00

## 2024-08-21 NOTE — OB PROVIDER DELIVERY SUMMARY - NSSELHIDDEN_OBGYN_ALL_OB_FT
[NS_DeliveryAttending1_OBGYN_ALL_OB_FT:[id number]],[NS_DeliveryAssist1_OBGYN_ALL_OB_FT:[id number]]

## 2024-08-21 NOTE — OB RN PATIENT PROFILE - NS_PLACENTAPOSESS_OBGYN_ALL_OB
Service Date: 09/13/2021  Discharge Date: 09/13/2021    The patient was hospitalized between 08/04-09/13/2021.  On the day of discharge, 40 minutes was spent with the patient.  Greater than 50% of the time was spent on counseling, discussing discharge medications, discussing reasons for hospitalization and encouraging the patient to stay compliant with medications in the future.    CHIEF COMPLAINT AND REASON FOR ADMISSION:  The patient is a 24-year-old gentleman admitted on 72-hour hold due to psychotic symptoms.  He did not appear to be a reliable historian and at times appeared to be guarded, at times loose and would deviate from the topic of conversation.  He does report, however, that he was taking his Haldol maybe 50% of the time.  He did not volunteer information that he in fact was also supposed to be taking Zyprexa and to be on Invega shots.  Reported that he was experiencing auditory hallucinations inside of his head, which at times were good and positive, at times bad.  Reports that the police was called his apartment a number of times, but he did not allow them in.  He also said that on the day of admission, he was practicing with his baseball bat, which apparently looked threatening to some of his neighbors who called the police.  The patient said that he suspected it might have been his ACT (assertive community training) team member who called the police as well.  During the visit, Jerel was making clearly delusional statements stating that the team tried to wiretap his place and that the police officers who were in his place also put in some listening device.  He stated that he believed that people were trying to manipulate his brain via satellite, people from another country has been interested in information that he possesses.  For more details about patient's presentation and past psychiatric history, please refer to Dr. Keyon Tran's note from 08/05/2021.     DISCHARGE DIAGNOSES:    1.   "Schizophrenia, paranoid type, chronic, acute exacerbation.    2.  Cannabis use disorder.    CONSULTS:  Internal Medicine was consulted only once to do a physical for placement to intensive residential treatment services.    LAB WORK:  Patient's urinalysis was normal.  He had 2 EKGs: On 08/30/2021, EKG showed sinus bradycardia with ventricular rate of 54, degree of T wave amplitude in lateral leads, on 09/02, another EKG showed normal sinus rhythm, no significant changes compared with EKG from 08/30.     HOSPITAL COURSE:  The patient presented as guarded, not very insightful, and during visit with me talked extensively about how wrong it was to bring him to the hospital, that they absolutely had no point in bringing him here and that he would rather be somewhere else and in his apartment.  He insisted that he should go back to his apartment.  He eventually stopped talking to me after I asked him why he had not been taking Invega Sustenna for a few months.  The patient was pretty resistant to giving permission to communicate with his ACT Team and Dr. Tracy.  He rather reluctantly took his medications.  He agreed to discontinue Zyprexa and to increase his dose of Haldol, eventually agreed to switch from Invega Sustenna to Haldol.  When was asked how he was doing, he was making some bizarre statements such as, \"I need my liver and neck and the rest of my body to function well in order to be fertile.\"  He remained pretty loose and disorganized and liked to talk about conspiracy theories, about \"being raped by the police.\" When asked what he meant by that, he said he was talking metaphorically and people who do not understand him should not talk to him.  He reported that he was hearing voices, but was rather reluctant to talk about the content.      He eventually agreed to give me permission to talk to Dr. Tracy's team and I did talk to Dr. Tracy's resident, Dr. Norberto Duvall.  The dose of Haldol was increased.  I did " eventually talk to Dr. Duvall who advised me that Jerel seemed to be doing well on Haldol and Haldol decanoate.  He then developed extrapyramidal side effects and was switched to Invega. Dr. Duvall said that they would support Jerel being on another long-acting injectable medication and it would be okay with him going to IRTS; however, when I told Jerel that I talked to Dr. Duvall and he was in support of him being on a long-acting injectable medications and going to IRTS instead of Jerel going back to his apartment. Jerel got agitated, punched a wall and made a hole in the wall.  The patient was also reported being very agitated, he had been going go up and down the hallway tipping over chairs and angry.  He was pretty upset with this provider and I offered him to meet with Dr. Lazaro; however, Jerel said that he was familiar with Dr. Lazaro and did not believe that he would get anything beneficial from recommendations from her.      Apparently Jerel was getting contradictory messages from his family.  He said that family was in support of him going back to his apartment while, in fact, we were told repeatedly by family that family did not want to pay for his apartment anymore and would like him to go to [facility].  We had to ask family to tell this to Jerel directly and when this was done, Jerel stopped being so resistant to going to intensive residential treatment services.  He was continued on Haldol now at 10 mg 2 times a day was admitted and given Haldol decanoate 50 mg intramuscularly.  His dose of Cogentin was increased to 1 mg 2 times a day.  He was back and forth about going to intensive residential treatment services and on going there, but again appeared to be more open to doing so when his family said that they would not pay for his apartment anymore.  The patient continued to refuse to let us talk to his family; however, interestingly enough, his ACT Team could talk to his family.   Patient was referred to  intensive residential treatment services (IRTS) and was accepted to a place called [facility].  Shortly before his departure, he appeared to be somewhat more pleasant.  He asked to decrease his morning Haldol dose because of tightness in his jaw and was worried that he had it because of Haldol.  I advised him that we can try to do that, although I am concerned about increase in his auditory hallucinations.  We discontinued his morning dose of Haldol.  The patient after that started reporting more auditory hallucinations and seemed to be more open with them, described them more as a nuisance, not something that was bothering him.  He, unfortunately, continued to have very poor insight.  He was pretty irritable.  He continued to say that there was no need to bring him into the hospital, that he should left alone and go live in his apartment.  He was talking about having a right to refuse his injectable medications; however, I had a pretty direct conversation with Jerel and informed him that he was under commitment and Clark'd and he was court ordered to take neuroleptics on [place] list and if he wanted to make any changes in his medications, he should talk about them to his outpatient provider.  He was pretty upset about this, was upset when we told him that he could not leave on Friday as he was hoping for and should go directly door to door to [facility].  He appeared to be irritated, but denied presence of suicidal or homicidal thoughts, contracted for safety and was discharged to [facility] on the following medications.    DISCHARGE MEDICATIONS:  Artificial saliva 2 mL every hour as needed for dry mouth, Cogentin 1 mg 2 times a day,  Benadryl 50 mg at bedtime, gabapentin 3 times a day p.r.n. for agitation, Haldol 10 mg at night, Haldol 75 mg into muscle every 30 days (the patient is due for his next injection on 09/15/2021), hydroxyzine 25 mg as needed every 4 hours for anxiety,  ibuprofen 200 mg 4 times a day p.r.n. for pain, lidocaine 5% patch 1 patch onto skin daily as needed for moderate pain (to prevent lidocaine toxicity, patient should be patch free for 12 hours daily), melatonin 1 mg nightly as needed for sleep, trazodone 50 mg nightly as needed for sleep and can be repeated after 60 minutes, and vitamin D 25 mcg daily.  Medications were sent on [facility] in [place], [place].    FOLLOWUP APPOINTMENTS:  Health care followup appointment was scheduled with the [facility] Team with psychiatrist, Dr. Loc Tracy, current resident Dr. Norberto Duvall.  The patient has a RN case manager Noemy Leigh, phone number [phone].      Keyon Tran MD        D: 2021   T: 2021   MT: CONG    Name:     NUSRAT ROJO  MRN:      [id number]        Account:      [id number]   :      1996           Service Date: 2021                                  Discharge Date: 2021     Document: [id number]    cc:  Vince Zheng MD      No

## 2024-08-21 NOTE — OB PROVIDER H&P - HISTORY OF PRESENT ILLNESS
Prenatal Care: Dr. Christianson    33 year old  @ 39.1 weeks, BRAIN 2024 dated by LMP consistent with 1st Trimester US, presents to L&D for scheduled repeat  secondary to history of prior . NPO TIME 10 pm last night. Patient admits to normal fetal movement. Denies vaginal bleeding, leakage of fluid, painful contractions/lower abdominal cramping, fever/chills, shortness of breath,  chest pain, increased swelling, difficulty ambulating, loss of taste/smell, nausea/vomiting/diarrhea, rash, weakness, paresthesia, change in appetite, dizziness, lightheadedness, cough, nasal congestion, runny nose    Antepartum Course:  1. Cigarette Smoker  2. Asthma     Med Hx:   Asthma - diagnosed at 9 years old, well controlled taking Albuterol PRN (twice a day) and Symbicort (twice a day), prior to pregnancy taking regimen of Albuterol as needed (taking very sparingly) with Singulair and Symbicort, states she has not been evaluated by a pulmonologist in years    - Last took her albuterol via nebulizer this morning at 530 am   Current Cigarette Smoker - been smoking since 12 years old, currently smoking about 7 cigarettes a day, patient currently not interested in cessation tools, states she does not want a nicotine patch while in hospital  BMI 46  History of anal fissues s/p surgery in   Denies HTN, DM, CAD, or other medical issues

## 2024-08-21 NOTE — BRIEF OPERATIVE NOTE - OPERATION/FINDINGS
scheduled repeat  section   Viable female infant, apgars 8/9, weight 3690 g  adhesions noted omentum to fascia   vaccuum assisted delivery   Hysterotomy closed in 1 layer using Caprosyn suture  Interceed placed over hysterotomy   fascia was closed using 0-Vicryl suture  Grossly normal uterus, tubes, and ovaries  Subcutaneous tissue was reapproximated in interrupted fashion using plain gut  Skin was closed in subcuticular fashion using Monocryl suture  Dermabond and Aquacel dressing used   Patient and infant to recovery in stable condition    QBL: 561 cc  IVF: 2700 cc    UOP: 75 cc  dictation #[id number]    Nerissa Glaser CNM

## 2024-08-21 NOTE — OB RN DELIVERY SUMMARY - NS_SEPSISRSKCALC_OBGYN_ALL_OB_FT
EOS calculated successfully. EOS Risk Factor: 0.5/1000 live births ([facility] national incidence); GA=39w1d; Temp=98.2; ROM=0.033; GBS='Negative'; Antibiotics='No antibiotics or any antibiotics < 2 hrs prior to birth'

## 2024-08-21 NOTE — OB PROVIDER H&P - PRETERM DELIVERIES, OB PROFILE
Called for post-sp check-up. Pt reported the following regarding the procedure site:    Change in pain?: Yes    Concerns?: No    Confirmed [facility]V appt?: Yes    
0

## 2024-08-21 NOTE — OB NEONATOLOGY/PEDIATRICIAN DELIVERY SUMMARY - NSPEDSNEONOTESA_OBGYN_ALL_OB_FT
Peds called to OR for vacuum-assisted delivery . 39.1 wk LGA female born via CS to a 34 y/o  mother.  Maternal medical/surgical/pregnancy history of asthma. No significant maternal or prenatal history. Maternal labs include Blood Type O+ , HIV - , RPR NR , Rubella I , Hep B - , GBS - on . AROM with clear fluids. Highest maternal temp: 36.8 C. EOS 0.04 . Baby emerged vigorous, crying, was warmed, dried, suctioned, and stimulated with APGARS of */* . *Nuchalx1. Resuscitation included: bulb suction and stimulation. Mom plans to initiate bottle feeding , consents Hep B vaccine    : 2024  TOB: ___  BW: 3690g    Physical Exam (Post-Delivery)  Gen: NAD; well-appearing  HEENT: NC/AT; anterior fontanelle open and flat; ears and nose clinically patent, normally set; no tags, no cleft palate appreciated  Skin: pink, warm, well-perfused, no rash  Resp: non-labored breathing  Abd: soft, NT/ND; no masses appreciated, umbilical cord with 3 vessels  Extremities: moving all extremities, no crepitus; hips negative O/B  MSK: no clavicular fracture appreciated  : Yair I; no abnormalities; anus patent  Back: no sacral dimple  Neuro: +melchor, +babinski, grasp, good tone throughout Peds called to OR for vacuum-assisted delivery . 39.1 wk LGA female born via CS to a 32 y/o  mother.  Maternal medical/surgical/pregnancy history of asthma. No significant maternal or prenatal history. Maternal labs include Blood Type O+ , HIV - , RPR NR , Rubella I , Hep B - , GBS - on . AROM with clear fluids. Highest maternal temp: 36.8 C. EOS 0.04 . Baby emerged vigorous, crying, was warmed, dried, suctioned, and stimulated with APGARS of */* . *Nuchalx1. Resuscitation included: bulb suction and stimulation. Mom plans to initiate bottle feeding , consents Hep B vaccine    : 2024  TOB: 15:11  BW: 3690g    Physical Exam (Post-Delivery)  Gen: NAD; well-appearing  HEENT: NC/AT; anterior fontanelle open and flat; ears and nose clinically patent, normally set; no tags, no cleft palate appreciated  Skin: pink, warm, well-perfused, no rash  Resp: non-labored breathing  Abd: soft, NT/ND; no masses appreciated, umbilical cord with 3 vessels  Extremities: moving all extremities, no crepitus; hips negative O/B  MSK: no clavicular fracture appreciated  : Yair I; no abnormalities; anus patent  Back: no sacral dimple  Neuro: +melchor, +babinski, grasp, good tone throughout Peds called to OR for vacuum-assisted delivery . 39.1 wk LGA female born via CS to a 32 y/o  mother.  Maternal medical/surgical/pregnancy history of asthma and recent COVID infection (~10 days prior). No significant maternal or prenatal history. Maternal labs include Blood Type O+ , HIV - , RPR NR , Rubella I , Hep B - , GBS - on . AROM with clear fluids. Highest maternal temp: 36.8 C. EOS 0.04 . Baby emerged vigorous, crying, was warmed, dried, suctioned, and stimulated with APGARS of */* . *Nuchalx1. Resuscitation included: bulb suction and stimulation. Mom plans to initiate bottle feeding , consents Hep B vaccine    : 2024  TOB: 15:11  BW: 3690g    Physical Exam (Post-Delivery)  Gen: NAD; well-appearing  HEENT: NC/AT; anterior fontanelle open and flat; ears and nose clinically patent, normally set; no tags, no cleft palate appreciated  Skin: pink, warm, well-perfused, no rash  Resp: non-labored breathing  Abd: soft, NT/ND; no masses appreciated, umbilical cord with 3 vessels  Extremities: moving all extremities, no crepitus; hips negative O/B  MSK: no clavicular fracture appreciated  : Yair I; no abnormalities; anus patent  Back: no sacral dimple  Neuro: +melchor, +babinski, grasp, good tone throughout

## 2024-08-21 NOTE — OB RN PATIENT PROFILE - PATIENT'S PREFERRED PRONOUN
Anesthesia Post Evaluation    Patient: Verona Garvin    Procedure(s) Performed: * No procedures listed *    Final Anesthesia Type: epidural    Patient location during evaluation: labor & delivery  Patient participation: Yes- Able to Participate  Level of consciousness: awake and alert and awake  Post-procedure vital signs: reviewed and stable  Pain management: adequate  Airway patency: patent    PONV status at discharge: No PONV  Anesthetic complications: no      Cardiovascular status: blood pressure returned to baseline and hemodynamically stable  Respiratory status: unassisted and spontaneous ventilation  Hydration status: euvolemic  Follow-up not needed.          Vitals Value Taken Time   /72 12/06/20 0642   Temp 36.8 °C (98.3 °F) 12/06/20 0642   Pulse 89 12/06/20 0642   Resp 18 12/06/20 0642   SpO2 99 % 12/06/20 0642         No case tracking events are documented in the log.      Pain/Dianne Score: Pain Rating Prior to Med Admin: 6 (12/6/2020  3:15 AM)        
Her/She

## 2024-08-21 NOTE — OB RN PATIENT PROFILE - HEALTH/HEALTHCARE ANXIETIES, PROFILE
gets very emotional, may cry due to date of brothers death; verbalized pregnancy not feeling "real" due to # of miscarriages in the past and being told that she needed a hysterectomy.

## 2024-08-21 NOTE — OB PROVIDER H&P - ASSESSMENT
33 year old  @ 39.1 weeks, admitted to L&D for scheduled repeat  for history of prior , Reactive NST, vital signs stable  - Admit to L&D  - NPO  - IV access, CBC/T&C/RPR  - Pepcid and Bicitra as per pre-op policy  - T&C 2 units PRBCs  - Anesthesia consult   - Consent to be discussed and obtained by Dr. Christianson  Risks, benefits, alternatives, and possible complications have been discussed in detail with the patient in her native language. Pre-admission, admission, and post admission procedures and expectations were discussed in detail. All questions answered, all appropriate hospital consents were signed. Informed consent was obtained  - Discussed with Dr. Christianson   33 year old  @ 39.1 weeks, admitted to L&D for scheduled repeat  for history of prior , Reactive NST, vital signs stable  - Admit to L&D  - NPO  - IV access, CBC/T&C/RPR  - Pepcid and Bicitra as per pre-op policy  - T&C 2 units PRBCs  - Anesthesia consult   - Consent to be discussed and obtained by Dr. Christianson  Risks, benefits, alternatives, and possible complications have been discussed in detail with the patient in her native language. Pre-admission, admission, and post admission procedures and expectations were discussed in detail. All questions answered, all appropriate hospital consents were signed. Informed consent was obtained  - Discussed with Dr. Christianson    HELLP labs drawn with BPs mild range 150s/90s    Urinalysis Basic - ( 21 Aug 2024 08:15 )    Color: Yellow / Appearance: Clear / S.014 / pH: x  Gluc: x / Ketone: Trace mg/dL  / Bili: Negative / Urobili: 0.2 mg/dL   Blood: x / Protein: 30 mg/dL / Nitrite: Negative   Leuk Esterase: Negative / RBC: 0 /HPF / WBC 1 /HPF   Sq Epi: x / Non Sq Epi: 4 /HPF / Bacteria: Occasional /HPF    P/C 0.6    Pre-eclampsia without severe features as per MD Christianson, who is at bedside and thoroughly discussed diagnosis with patient, all questions answered

## 2024-08-21 NOTE — OB PROVIDER H&P - NSHPSOCIALHISTORY_GEN_ALL_CORE
Social: See HPI above, Denies alcohol/illicit drug use    Psych: Denies anxiety/depression, postpartum depression, or other mental health disease

## 2024-08-21 NOTE — OB PROVIDER H&P - NSHPPHYSICALEXAM_GEN_ALL_CORE
Physical Exam:  Vitals: ICU Vital Signs Last 24 Hrs  T(C): 36.8 (21 Aug 2024 07:59), Max: 36.8 (21 Aug 2024 07:59)  T(F): 98.2 (21 Aug 2024 07:59), Max: 98.2 (21 Aug 2024 07:59)  HR: 113 (21 Aug 2024 07:59) (113 - 118)  BP: 153/90 (21 Aug 2024 07:59) (153/90 - 153/91)  BP(mean): --  ABP: --  ABP(mean): --  RR: 18 (21 Aug 2024 07:59) (18 - 18)  SpO2: --    O2 Parameters below as of 21 Aug 2024 07:59  Patient On (Oxygen Delivery Method): room air    Gen: NAD, A+O x 3, resting comfortably  Resp: clear to ausculation bilaterally  Cardio: regular rate and rhythm   Abd: Gravid, soft, non-distended, non-tender to superficial and deep palpation in all 4 quadrants, no rebound/guarding  Ext: Warm, well perfused, 1+ nonpitting edema bilaterally    EFM: 135 bpm moderate variability with spontaneous accelerations no decelerations, Reactive NST   [place]: Contractions every 5 minutes

## 2024-08-21 NOTE — OB RN PATIENT PROFILE - NSICDXPASTSURGICALHX_GEN_ALL_CORE_FT
PAST SURGICAL HISTORY:  H/O:  Section     History of appendectomy 2005    History of tonsillectomy

## 2024-08-21 NOTE — BRIEF OPERATIVE NOTE - NSICDXBRIEFPOSTOP_GEN_ALL_CORE_FT
POST-OP DIAGNOSIS:  Delivery by  section using transverse incision of lower segment of uterus 21-Aug-2024 16:46:28  Nerissa Glaser

## 2024-08-21 NOTE — OB PROVIDER H&P - NS_OBGYNHISTORY_OBGYN_ALL_OB_FT
OB History:   History of 5 SABs complete  2011, primary  for failed IOL, FT, Male, 8 lbs 11 oz, uncomplicated as per patient, delivered at [facility]   Current pregnancy:  - Current cigarette smoker, about 7 cigarettes a day  - Denies HTN/DM/Fetal issues    Prenatal Labs Reviewed:  T&S: O+  Rubella: Immune   Hep B: Neg  Hep C: Neg  HIV: Neg  RPR: Neg  GCT: not found in chart  G/C: Neg  GBS: Neg     Ultrasounds:   : Breech, posterior placenta, BPP , EFW 1635 g (26%ile)    GYN Hx: Denies fibroids, ovarian cysts, HSV/ STDs, abnormal pap smears

## 2024-08-21 NOTE — OB RN DELIVERY SUMMARY - NSSELHIDDEN_OBGYN_ALL_OB_FT
[NS_DeliveryAttending1_OBGYN_ALL_OB_FT:[id number]],[NS_DeliveryAssist1_OBGYN_ALL_OB_FT:[id number]],[NS_DeliveryRN_OBGYN_ALL_OB_FT:[id number]]

## 2024-08-21 NOTE — OB RN INTRAOPERATIVE NOTE - NSSELHIDDEN_OBGYN_ALL_OB_FT
[NS_DeliveryAttending1_OBGYN_ALL_OB_FT:[id number]] [NS_DeliveryAttending1_OBGYN_ALL_OB_FT:[id number]],[NS_DeliveryAssist1_OBGYN_ALL_OB_FT:[id number]],[NS_DeliveryRN_OBGYN_ALL_OB_FT:[id number]=]

## 2024-08-21 NOTE — PROVIDER CONTACT NOTE (OTHER) - ASSESSMENT
UOP for this hour 15cc's, to be adequate pt needs to produce 52cc's. Fundus firm and at umbilicus, light lochia. All VSS.

## 2024-08-21 NOTE — OB PROVIDER H&P - NSLOWPPHRISK_OBGYN_A_OB
Rajan Pregnancy/Less than or equal to 4 previous vaginal births/No known bleeding disorder/No history of postpartum hemorrhage/No other PPH risks indicated

## 2024-08-21 NOTE — OB PROVIDER H&P - NSICDXPASTSURGICALHX_GEN_ALL_CORE_FT
PAST SURGICAL HISTORY:  Anal fissure     H/O:  Section     History of appendectomy 2005    History of tonsillectomy

## 2024-08-21 NOTE — OB PROVIDER DELIVERY SUMMARY - NSPROVIDERDELIVERYNOTE_OBGYN_ALL_OB_FT
scheduled repeat  section   Viable female infant, apgars 8/9, weight 3690 g  adhesions noted omentum to fascia   vaccuum assisted delivery   Hysterotomy closed in 1 layer using Caprosyn suture  Interceed placed over hysterotomy   fascia was closed using 0-Vicryl suture  Grossly normal uterus, tubes, and ovaries  Subcutaneous tissue was reapproximated in interrupted fashion using plain gut  Skin was closed in subcuticular fashion using Monocryl suture  Dermabond and Aquacel dressing used   Patient and infant to recovery in stable condition    QBL: 561 cc  IVF: 2700 cc    UOP: 75 cc  dictation #[id number]    Nerissa Glaser CNM

## 2024-08-22 ENCOUNTER — TRANSCRIPTION ENCOUNTER (OUTPATIENT)
Age: 34
End: 2024-08-22

## 2024-08-22 LAB
BASOPHILS # BLD AUTO: 0.03 K/UL — SIGNIFICANT CHANGE UP (ref 0–0.2)
BASOPHILS NFR BLD AUTO: 0.2 % — SIGNIFICANT CHANGE UP (ref 0–2)
EOSINOPHIL # BLD AUTO: 0.01 K/UL — SIGNIFICANT CHANGE UP (ref 0–0.5)
EOSINOPHIL NFR BLD AUTO: 0.1 % — SIGNIFICANT CHANGE UP (ref 0–6)
HCT VFR BLD CALC: 30 % — LOW (ref 34.5–45)
HGB BLD-MCNC: 10 G/DL — LOW (ref 11.5–15.5)
IANC: 14.16 K/UL — HIGH (ref 1.8–7.4)
IMM GRANULOCYTES NFR BLD AUTO: 1.2 % — HIGH (ref 0–0.9)
LYMPHOCYTES # BLD AUTO: 1.19 K/UL — SIGNIFICANT CHANGE UP (ref 1–3.3)
LYMPHOCYTES # BLD AUTO: 7.2 % — LOW (ref 13–44)
MCHC RBC-ENTMCNC: 30.1 PG — SIGNIFICANT CHANGE UP (ref 27–34)
MCHC RBC-ENTMCNC: 33.3 GM/DL — SIGNIFICANT CHANGE UP (ref 32–36)
MCV RBC AUTO: 90.4 FL — SIGNIFICANT CHANGE UP (ref 80–100)
MONOCYTES # BLD AUTO: 0.94 K/UL — HIGH (ref 0–0.9)
MONOCYTES NFR BLD AUTO: 5.7 % — SIGNIFICANT CHANGE UP (ref 2–14)
NEUTROPHILS # BLD AUTO: 14.16 K/UL — HIGH (ref 1.8–7.4)
NEUTROPHILS NFR BLD AUTO: 85.6 % — HIGH (ref 43–77)
NRBC # BLD: 0 /100 WBCS — SIGNIFICANT CHANGE UP (ref 0–0)
NRBC # FLD: 0 K/UL — SIGNIFICANT CHANGE UP (ref 0–0)
PLATELET # BLD AUTO: 205 K/UL — SIGNIFICANT CHANGE UP (ref 150–400)
RBC # BLD: 3.32 M/UL — LOW (ref 3.8–5.2)
RBC # FLD: 15.6 % — HIGH (ref 10.3–14.5)
WBC # BLD: 16.52 K/UL — HIGH (ref 3.8–10.5)
WBC # FLD AUTO: 16.52 K/UL — HIGH (ref 3.8–10.5)

## 2024-08-22 RX ORDER — ACETAMINOPHEN 325 MG/1
3 TABLET ORAL
Qty: 0 | Refills: 0 | DISCHARGE
Start: 2024-08-22

## 2024-08-22 RX ORDER — IBUPROFEN 600 MG
600 TABLET ORAL EVERY 6 HOURS
Refills: 0 | Status: DISCONTINUED | OUTPATIENT
Start: 2024-08-22 | End: 2024-08-23

## 2024-08-22 RX ORDER — IBUPROFEN 600 MG
1 TABLET ORAL
Qty: 0 | Refills: 0 | DISCHARGE
Start: 2024-08-22

## 2024-08-22 RX ORDER — OXYCODONE HYDROCHLORIDE 5 MG/1
5 TABLET ORAL
Refills: 0 | Status: DISCONTINUED | OUTPATIENT
Start: 2024-08-22 | End: 2024-08-23

## 2024-08-22 RX ORDER — BUDESONIDE AND FORMOTEROL FUMARATE 80; 4.5 UG/1; UG/1
2 AEROSOL, METERED RESPIRATORY (INHALATION)
Qty: 0 | Refills: 0 | DISCHARGE
Start: 2024-08-22

## 2024-08-22 RX ADMIN — Medication 2 PUFF(S): at 01:00

## 2024-08-22 RX ADMIN — Medication 2 PUFF(S): at 20:31

## 2024-08-22 RX ADMIN — Medication 10000 UNIT(S): at 00:00

## 2024-08-22 RX ADMIN — KETOROLAC TROMETHAMINE 30 MILLIGRAM(S): 30 INJECTION, SOLUTION INTRAMUSCULAR at 05:02

## 2024-08-22 RX ADMIN — BUDESONIDE AND FORMOTEROL FUMARATE 2 PUFF(S): 80; 4.5 AEROSOL, METERED RESPIRATORY (INHALATION) at 08:46

## 2024-08-22 RX ADMIN — Medication 600 MILLIGRAM(S): at 23:41

## 2024-08-22 RX ADMIN — Medication 10000 UNIT(S): at 23:42

## 2024-08-22 RX ADMIN — Medication 2 PUFF(S): at 08:46

## 2024-08-22 RX ADMIN — BUDESONIDE AND FORMOTEROL FUMARATE 2 PUFF(S): 80; 4.5 AEROSOL, METERED RESPIRATORY (INHALATION) at 20:31

## 2024-08-22 RX ADMIN — ACETAMINOPHEN 975 MILLIGRAM(S): 325 TABLET ORAL at 03:45

## 2024-08-22 RX ADMIN — KETOROLAC TROMETHAMINE 30 MILLIGRAM(S): 30 INJECTION, SOLUTION INTRAMUSCULAR at 12:25

## 2024-08-22 RX ADMIN — ACETAMINOPHEN 975 MILLIGRAM(S): 325 TABLET ORAL at 20:31

## 2024-08-22 RX ADMIN — Medication 10000 UNIT(S): at 12:27

## 2024-08-22 RX ADMIN — ACETAMINOPHEN 975 MILLIGRAM(S): 325 TABLET ORAL at 15:10

## 2024-08-22 RX ADMIN — KETOROLAC TROMETHAMINE 30 MILLIGRAM(S): 30 INJECTION, SOLUTION INTRAMUSCULAR at 05:30

## 2024-08-22 RX ADMIN — OXYCODONE HYDROCHLORIDE 5 MILLIGRAM(S): 5 TABLET ORAL at 23:41

## 2024-08-22 RX ADMIN — Medication 600 MILLIGRAM(S): at 17:40

## 2024-08-22 RX ADMIN — ACETAMINOPHEN 975 MILLIGRAM(S): 325 TABLET ORAL at 08:55

## 2024-08-22 RX ADMIN — KETOROLAC TROMETHAMINE 30 MILLIGRAM(S): 30 INJECTION, SOLUTION INTRAMUSCULAR at 12:55

## 2024-08-22 RX ADMIN — Medication 600 MILLIGRAM(S): at 17:10

## 2024-08-22 RX ADMIN — ACETAMINOPHEN 975 MILLIGRAM(S): 325 TABLET ORAL at 03:00

## 2024-08-22 RX ADMIN — ACETAMINOPHEN 975 MILLIGRAM(S): 325 TABLET ORAL at 09:25

## 2024-08-22 RX ADMIN — ACETAMINOPHEN 975 MILLIGRAM(S): 325 TABLET ORAL at 21:10

## 2024-08-22 RX ADMIN — ACETAMINOPHEN 975 MILLIGRAM(S): 325 TABLET ORAL at 14:40

## 2024-08-22 NOTE — CHART NOTE - NSCHARTNOTEFT_GEN_A_CORE
Review of vital signs and HELLP labs reveal that this patient has met criteria for PEC without severe features, based on labile BPs >4 hours apart and P/C ratio 0.6 on 24. Patient denies current headache, visual disturbances, epigastric pain, RUQ pain, N/V, lightheadedness, chest pain, SOB.     Vital Signs Last 24 Hrs  T(C): 36.8 (22 Aug 2024 10:00), Max: 36.8 (22 Aug 2024 10:00)  T(F): 98.2 (22 Aug 2024 10:00), Max: 98.2 (22 Aug 2024 10:00)  HR: 77 (22 Aug 2024 10:00) (67 - 90)  BP: 130/68 (22 Aug 2024 10:00) (99/70 - 160/94)  BP(mean): 80 (21 Aug 2024 23:45) (79 - 114)  RR: 18 (22 Aug 2024 10:00) (12 - 22)  SpO2: 100% (22 Aug 2024 10:00) (95% - 100%)    Parameters below as of 22 Aug 2024 10:00  Patient On (Oxygen Delivery Method): room air                          10.0   16.52 )-----------( 205      ( 22 Aug 2024 06:15 )             30.0     08-21    140  |  103  |  12  ----------------------------<  105<H>  4.4   |  19<L>  |  0.62    Ca    10.2      21 Aug 2024 07:55    TPro  6.7  /  Alb  3.4  /  TBili  <0.2  /  DBili  x   /  AST  26  /  ALT  27  /  AlkPhos  134<H>  08-21    Urinalysis Basic - ( 21 Aug 2024 08:15 )    Color: Yellow / Appearance: Clear / S.014 / pH: x  Gluc: x / Ketone: Trace mg/dL  / Bili: Negative / Urobili: 0.2 mg/dL   Blood: x / Protein: 30 mg/dL / Nitrite: Negative   Leuk Esterase: Negative / RBC: 0 /HPF / WBC 1 /HPF   Sq Epi: x / Non Sq Epi: 4 /HPF / Bacteria: Occasional /HPF    Protein/Creatinine Ratio Calculation: 0.6 Ratio    A/P:  33 y.o POD#1 s/p rLTCS meeting criteria for PEC without severe features    -Patient asymptomatic  -BPs currently 120s-130/60s-78  -Prescription for blood pressure monitor sent to [facility].  -Diagnosis of PEC, as well as plan for discharge home at the earliest 24 discussed with patient, who verbalizes understanding and agrees to plan.  -Continue q4 hour vital signs    Olga WAGNER-BC

## 2024-08-22 NOTE — DISCHARGE NOTE OB - ADDITIONAL INSTRUCTIONS
Follow up with Dr. Christianson in office in 1 week. Follow up with Dr. Christianson in office in 1 week for B/P check

## 2024-08-22 NOTE — DISCHARGE NOTE OB - NS MD DC FALL RISK RISK
For information on Fall & Injury Prevention, visit: [email or web address] OR  [email or web address] OR  https://www.cdc.gov/steadi/patient.html

## 2024-08-22 NOTE — DISCHARGE NOTE OB - PLAN OF CARE
Follow-up in your OB office within 1 week for a blood pressure check.   - prescription for blood pressure monitor at [facility] (1st floor of [facility], back of gift shop).  -Please take your blood pressure 3x/day. Call your doctor if your blood pressure is 140/90 or higher [140 (top number) OR 90 (bottom number)]. Return to hospital if your blood pressure is 160/110 or higher [160 (top number)  (bottom number)]. Call your doctor if you experience symptoms such as headache, blurry vision, epigastric pain, severe swelling, or nausea/vomiting. Follow up in the office in 2 weeks for an incision check and in 6 weeks for a postpartum visit.   Continue a regular diet. Resume normal activity as tolerated. No heavy lifting, driving, or strenuous activity for 2 weeks. Nothing per vagina such as tampons, intercourse, douches or tub baths for 6 weeks or until you see your doctor. Call your doctor with any signs and symptoms of infection such as fever, chills, nausea or vomiting. Call your doctor with redness or swelling at the incision site, fluid leakage or wound separation. Call your doctor if you're unable to tolerate food, increase in vaginal bleeding or have difficulty urinating. Call your doctor if you have pain that is not relieved by your prescribed medications. Notify your doctor with any other concerns.  your blood pressure monitor from [facility] on 1st floor of [facility]. Follow-up in your OB office within 1 week for a blood pressure check.   Please take your blood pressure 3x/day. Call your doctor if your blood pressure is 140 (top number) OR 90 (bottom number) or higher. Return to hospital if your blood pressure is 160 (top number)  (bottom number) or higher. Call your doctor if you experience symptoms such as headache, blurry vision, epigastric pain, or nausea/vomiting.

## 2024-08-22 NOTE — DISCHARGE NOTE OB - CARE PROVIDER_API CALL
Brian Christianson  Obstetrics and Gynecology  [address], [address]  [place] [address]  Phone: [phone]  Fax: [phone]  Follow Up Time:

## 2024-08-22 NOTE — DISCHARGE NOTE OB - MEDICATION SUMMARY - MEDICATIONS TO TAKE
I will START or STAY ON the medications listed below when I get home from the hospital:    blood pressure monitor  -- Electronic Blood Pressure monitor x1.  Please check your blood pressure 3x/day. Notify your doctor for blood pressure readings 140/90 or greater. Return to hospital for blood pressures greater than 160/110  -- Indication: For Preeclampsia    ibuprofen 600 mg oral tablet  -- 1 tab(s) by mouth every 6 hours as needed for  moderate pain  -- Indication: For Pain    acetaminophen 325 mg oral tablet  -- 3 tab(s) by mouth every 6 hours as needed for  mild pain  -- Indication: For Pain    albuterol 90 mcg/inh inhalation aerosol  -- 2 puff(s) inhaled every 6 hours As needed Bronchospasm  -- Indication: For Asthma    budesonide-formoterol 160 mcg-4.5 mcg/inh inhalation aerosol  -- 2 puff(s) inhaled 2 times a day  -- Indication: For Asthma   I will START or STAY ON the medications listed below when I get home from the hospital:    blood pressure monitor  -- Electronic Blood Pressure monitor x1.  Please check your blood pressure 3x/day. Notify your doctor for blood pressure readings 140/90 or greater. Return to hospital for blood pressures greater than 160/110  -- Indication: For Pre-eclampsia    ibuprofen 600 mg oral tablet  -- 1 tab(s) by mouth every 6 hours as needed for  moderate pain  -- Indication: For Pain    acetaminophen 325 mg oral tablet  -- 3 tab(s) by mouth every 6 hours as needed for  mild pain  -- Indication: For Pain    albuterol 90 mcg/inh inhalation aerosol  -- 2 puff(s) inhaled every 6 hours As needed Bronchospasm  -- Indication: For Asthma    budesonide-formoterol 160 mcg-4.5 mcg/inh inhalation aerosol  -- 2 puff(s) inhaled 2 times a day  -- Indication: For Asthma

## 2024-08-22 NOTE — PROGRESS NOTE ADULT - ASSESSMENT
A/P: 32yo POD#1 s/p rLTCS.  Patient is stable and doing well post-operatively.    #Asthma   - albuterol bid prn   - symbicort bid     #Cigarette smoker   - 7 cigarettes/day   - not interested in cessation tools   - declines nicotine patch while in hospital     #Postpartum   -   - H/H 12.7/38  - Continue regular diet.  - Increase ambulation.  - Continue motrin, tylenol, oxycodone PRN for pain control.  vs. continue PCEA for pain.  - F/u AM CBC (8/22)      Loan Solitario, PGY1    **INCOMPLETE**  A/P: 34yo POD#1 s/p rLTCS.  Patient is stable and doing well post-operatively with improved n/v.     #Asthma   - albuterol bid prn   - symbicort bid     #Cigarette smoker   - 7 cigarettes/day   - Per H&P not interested in cessation tools and declines nicotine patch while in hospital     #Postpartum   -   - H/H 12.7/38  - Continue regular diet.  - Increase ambulation.  - Continue motrin, tylenol, PRN for pain control. Patient does not want narcotics.   - F/u AM CBC (8/22)      Loan Solitario, PGY1

## 2024-08-22 NOTE — PROGRESS NOTE ADULT - SUBJECTIVE AND OBJECTIVE BOX
OB Progress Note:  Delivery, POD#1  **INCOMPLETE**     S: 32yo POD#1 s/p rLTCS. Her pain is well controlled. She is tolerating a regular diet. Not yet passing flatus. Denies N/V. Denies CP/SOB/lightheadedness/dizziness.   She is ambulating without difficulty.   Voiding spontaneously.     O:   Vital Signs Last 24 Hrs  T(C): 36.6 (22 Aug 2024 03:27), Max: 36.8 (21 Aug 2024 07:59)  T(F): 97.9 (22 Aug 2024 03:27), Max: 98.2 (21 Aug 2024 07:59)  HR: 78 (22 Aug 2024 03:) (67 - 118)  BP: 121/67 (22 Aug 2024 03:27) (99/70 - 160/94)  BP(mean): 80 (21 Aug 2024 23:45) (79 - 114)  RR: 18 (22 Aug 2024 03:27) (12 - 22)  SpO2: 100% (22 Aug 2024 03:27) (95% - 100%)    Parameters below as of 21 Aug 2024 23:45  Patient On (Oxygen Delivery Method): room air        Labs:  Blood type: O Positive  Rubella IgG: RPR: Negative                          12.7   11.78<H> >-----------< 259    (  @ 07:35 )             38.0    24 @ 07:55      140  |  103  |  12  ----------------------------<  105<H>  4.4   |  19<L>  |  0.62        Ca    10.2      21 Aug 2024 07:55    TPro  6.7  /  Alb  3.4  /  TBili  <0.2  /  DBili  x   /  AST  26  /  ALT  27  /  AlkPhos  134<H>  24 @ 07:55          PE:  General: NAD  Pulm:   Abdomen: Mildly distended, appropriately tender, incision c/d/i.  Extremities: No erythema, no pitting edema     OB Progress Note:  Delivery, POD#1    S: 32yo POD#1 s/p rLTCS. Patient with decreased UO yesterday in PACU but improved with 1L bolus and po hydration. Patient also with n/v and received zofran, reglan, and dexamethasone.  This morning her pain is well controlled with tylenol and motrin. Patient does not want narcotics. Patient with minimal po intake since delivery dt N/V and has only consumed a bag of pretzels. She reports the nausea is still present this morning but improved. She has not voided spontaneously yet but is passing flatus. No bowel movement yet. Denies CP/SOB/lightheadedness/dizziness. Reports minimal vaginal bleeding.   She is ambulating without difficulty around her room.     Patient is adamant about discharge today, if medically appropriate, as she must return home for another child.     O:   Vital Signs Last 24 Hrs  T(C): 36.6 (22 Aug 2024 03:27), Max: 36.8 (21 Aug 2024 07:59)  T(F): 97.9 (22 Aug 2024 03:27), Max: 98.2 (21 Aug 2024 07:59)  HR: 78 (22 Aug 2024 03:27) (67 - 118)  BP: 121/67 (22 Aug 2024 03:27) (99/70 - 160/94)  BP(mean): 80 (21 Aug 2024 23:45) (79 - 114)  RR: 18 (22 Aug 2024 03:27) (12 - 22)  SpO2: 100% (22 Aug 2024 03:27) (95% - 100%)    Parameters below as of 21 Aug 2024 23:45  Patient On (Oxygen Delivery Method): room air        Labs:  Blood type: O Positive  Rubella IgG: RPR: Negative                          12.7   11.78<H> >-----------< 259    (  @ 07:35 )             38.0    - @ 07:55      140  |  103  |  12  ----------------------------<  105<H>  4.4   |  19<L>  |  0.62        Ca    10.2      21 Aug 2024 07:55    TPro  6.7  /  Alb  3.4  /  TBili  <0.2  /  DBili  x   /  AST  26  /  ALT  27  /  AlkPhos  134<H>  24 @ 07:55          PE:  General: NAD  Pulm: breathing comfortably on room air   Abdomen: Moderately distended, appropriately tender, incision with aquacel dressing in place w/ minimal strikethrough   Extremities: No erythema, bilateral non-pitting edema

## 2024-08-22 NOTE — DISCHARGE NOTE OB - PATIENT PORTAL LINK FT
You can access the FollowMyHealth Patient Portal offered by [facility] by registering at the following website: [email or web address]. By joining [employer]’s FollowMyHealth portal, you will also be able to view your health information using other applications (apps) compatible with our system.

## 2024-08-22 NOTE — DISCHARGE NOTE OB - CARE PLAN
Principal Discharge DX:	Status post   Assessment and plan of treatment:	Follow up in the office in 2 weeks for an incision check and in 6 weeks for a postpartum visit.   Continue a regular diet. Resume normal activity as tolerated. No heavy lifting, driving, or strenuous activity for 2 weeks. Nothing per vagina such as tampons, intercourse, douches or tub baths for 6 weeks or until you see your doctor. Call your doctor with any signs and symptoms of infection such as fever, chills, nausea or vomiting. Call your doctor with redness or swelling at the incision site, fluid leakage or wound separation. Call your doctor if you're unable to tolerate food, increase in vaginal bleeding or have difficulty urinating. Call your doctor if you have pain that is not relieved by your prescribed medications. Notify your doctor with any other concerns.  Secondary Diagnosis:	Severe preeclampsia  Assessment and plan of treatment:	Follow-up in your OB office within 1 week for a blood pressure check.   - prescription for blood pressure monitor at [facility] (1st floor of [facility], back of [employer]).  -Please take your blood pressure 3x/day. Call your doctor if your blood pressure is 140/90 or higher [140 (top number) OR 90 (bottom number)]. Return to hospital if your blood pressure is 160/110 or higher [160 (top number)  (bottom number)]. Call your doctor if you experience symptoms such as headache, blurry vision, epigastric pain, severe swelling, or nausea/vomiting.   1 Principal Discharge DX:	Status post   Assessment and plan of treatment:	Follow up in the office in 2 weeks for an incision check and in 6 weeks for a postpartum visit.   Continue a regular diet. Resume normal activity as tolerated. No heavy lifting, driving, or strenuous activity for 2 weeks. Nothing per vagina such as tampons, intercourse, douches or tub baths for 6 weeks or until you see your doctor. Call your doctor with any signs and symptoms of infection such as fever, chills, nausea or vomiting. Call your doctor with redness or swelling at the incision site, fluid leakage or wound separation. Call your doctor if you're unable to tolerate food, increase in vaginal bleeding or have difficulty urinating. Call your doctor if you have pain that is not relieved by your prescribed medications. Notify your doctor with any other concerns.  Secondary Diagnosis:	Pre-eclampsia  Assessment and plan of treatment:	 your blood pressure monitor from [facility] on 1st floor of [facility]. Follow-up in your OB office within 1 week for a blood pressure check.   Please take your blood pressure 3x/day. Call your doctor if your blood pressure is 140 (top number) OR 90 (bottom number) or higher. Return to hospital if your blood pressure is 160 (top number)  (bottom number) or higher. Call your doctor if you experience symptoms such as headache, blurry vision, epigastric pain, or nausea/vomiting.

## 2024-08-23 ENCOUNTER — NON-APPOINTMENT (OUTPATIENT)
Age: 34
End: 2024-08-23

## 2024-08-23 VITALS
SYSTOLIC BLOOD PRESSURE: 135 MMHG | TEMPERATURE: 98 F | DIASTOLIC BLOOD PRESSURE: 71 MMHG | OXYGEN SATURATION: 99 % | RESPIRATION RATE: 18 BRPM | HEART RATE: 86 BPM

## 2024-08-23 RX ADMIN — OXYCODONE HYDROCHLORIDE 5 MILLIGRAM(S): 5 TABLET ORAL at 13:09

## 2024-08-23 RX ADMIN — Medication 80 MILLIGRAM(S): at 02:49

## 2024-08-23 RX ADMIN — ACETAMINOPHEN 975 MILLIGRAM(S): 325 TABLET ORAL at 02:49

## 2024-08-23 RX ADMIN — Medication 600 MILLIGRAM(S): at 06:50

## 2024-08-23 RX ADMIN — Medication 600 MILLIGRAM(S): at 06:25

## 2024-08-23 RX ADMIN — ACETAMINOPHEN 975 MILLIGRAM(S): 325 TABLET ORAL at 03:36

## 2024-08-23 RX ADMIN — OXYCODONE HYDROCHLORIDE 5 MILLIGRAM(S): 5 TABLET ORAL at 12:39

## 2024-08-23 RX ADMIN — ACETAMINOPHEN 975 MILLIGRAM(S): 325 TABLET ORAL at 10:04

## 2024-08-23 RX ADMIN — Medication 600 MILLIGRAM(S): at 00:20

## 2024-08-23 RX ADMIN — ACETAMINOPHEN 975 MILLIGRAM(S): 325 TABLET ORAL at 09:34

## 2024-08-23 RX ADMIN — OXYCODONE HYDROCHLORIDE 5 MILLIGRAM(S): 5 TABLET ORAL at 00:20

## 2024-08-23 RX ADMIN — BUDESONIDE AND FORMOTEROL FUMARATE 2 PUFF(S): 80; 4.5 AEROSOL, METERED RESPIRATORY (INHALATION) at 11:07

## 2024-08-23 NOTE — PROGRESS NOTE ADULT - SUBJECTIVE AND OBJECTIVE BOX
S: 32yo POD#2 s/p rLTCS c/b PEC w/o s/f. Blood pressures are currently controlled w/o medication. PCR 0.6, HELLP wnl. Denies s/s of PEC. PMH of anal fissure repaired in 2013. Patient Pain is well controlled. She is tolerating a regular diet and passing flatus. She is voiding spontaneously, and ambulating without difficulty. Denies CP/SOB. Denies lightheadedness/dizziness. Denies N/V.    O:  Vitals:  Vital Signs Last 24 Hrs  T(C): 36.9 (23 Aug 2024 06:29), Max: 37.1 (23 Aug 2024 02:15)  T(F): 98.4 (23 Aug 2024 06:29), Max: 98.7 (23 Aug 2024 02:15)  HR: 97 (23 Aug 2024 06:29) (86 - 97)  BP: 121/73 (23 Aug 2024 06:29) (99/86 - 121/73)  BP(mean): --  RR: 18 (23 Aug 2024 06:29) (17 - 18)  SpO2: 100% (23 Aug 2024 06:29) (99% - 100%)        MEDICATIONS  (STANDING):  acetaminophen     Tablet .. 975 milliGRAM(s) Oral <User Schedule>  budesonide 160 MICROgram(s)/formoterol 4.5 MICROgram(s) Inhaler 2 Puff(s) Inhalation two times a day  diphtheria/tetanus/pertussis (acellular) Vaccine (Adacel) 0.5 milliLiter(s) IntraMuscular once  heparin   Injectable 86980 Unit(s) SubCutaneous every 12 hours  ibuprofen  Tablet. 600 milliGRAM(s) Oral every 6 hours  oxytocin Infusion 333.333 milliUNIT(s)/Min (1000 mL/Hr) IV Continuous <Continuous>      MEDICATIONS  (PRN):  albuterol    90 MICROgram(s) HFA Inhaler 2 Puff(s) Inhalation every 6 hours PRN Bronchospasm  diphenhydrAMINE 25 milliGRAM(s) Oral every 6 hours PRN Pruritus  lanolin Ointment 1 Application(s) Topical every 6 hours PRN Sore Nipples  magnesium hydroxide Suspension 30 milliLiter(s) Oral two times a day PRN Constipation  oxyCODONE    IR 5 milliGRAM(s) Oral every 3 hours PRN Moderate to Severe Pain (4-10)  oxyCODONE    IR 5 milliGRAM(s) Oral once PRN Moderate to Severe Pain (4-10)  simethicone 80 milliGRAM(s) Chew every 4 hours PRN Gas      Labs:  Blood type: O Positive  Rubella IgG: RPR: Negative                          10.0<L>   16.52<H> >-----------< 205    ( 08-22 @ 06:15 )             30.0<L>                        12.7   11.78<H> >-----------< 259    ( 08-21 @ 07:35 )             38.0    08-21-24 @ 07:55      140  |  103  |  12  ----------------------------<  105<H>  4.4   |  19<L>  |  0.62        Ca    10.2      21 Aug 2024 07:55    TPro  6.7  /  Alb  3.4  /  TBili  <0.2  /  DBili  x   /  AST  26  /  ALT  27  /  AlkPhos  134<H>  08-21-24 @ 07:55          PE:  General: NAD  Abdomen: Soft, appropriately tender, incision c/d/i.  Lochia: WNL  Extremities: No calf tenderness    A/P: 32yo G  P POD#2 s/p LTCS.  Patient is stable and doing well post-operatively.    - Continue regular diet.  - Encouraged use of abdominal binder.  - Increase ambulation.  - Continue motrin, tylenol, oxycodone PRN for pain control.      BERNARD Javier S: 32yo POD#2 s/p [facility]- rLTCS c/b PEC w/o s/f. Blood pressures are currently controlled w/o medication. PCR 0.6, HELLP wnl. Denies s/s of PEC. PMH of anal fissure repaired in 2013. Patient is a current cigarette smoker and has been since 13yo and declined smoking cessation aids. She was evaluated by SW. , H/H 12.7/38->10/30. Patient Pain is controlled. She is tolerating a regular diet and passing flatus. She is voiding spontaneously, and ambulating without difficulty. Denies CP/SOB. Denies lightheadedness/dizziness. Denies N/V, headache, RUQ pain, visual change.     O:  Vitals:  Vital Signs Last 24 Hrs  T(C): 36.9 (23 Aug 2024 06:29), Max: 37.1 (23 Aug 2024 02:15)  T(F): 98.4 (23 Aug 2024 06:29), Max: 98.7 (23 Aug 2024 02:15)  HR: 97 (23 Aug 2024 06:29) (86 - 97)  BP: 121/73 (23 Aug 2024 06:29) (99/86 - 121/73)  BP(mean): --  RR: 18 (23 Aug 2024 06:29) (17 - 18)  SpO2: 100% (23 Aug 2024 06:29) (99% - 100%)        MEDICATIONS  (STANDING):  acetaminophen     Tablet .. 975 milliGRAM(s) Oral <User Schedule>  budesonide 160 MICROgram(s)/formoterol 4.5 MICROgram(s) Inhaler 2 Puff(s) Inhalation two times a day  diphtheria/tetanus/pertussis (acellular) Vaccine (Adacel) 0.5 milliLiter(s) IntraMuscular once  heparin   Injectable 22220 Unit(s) SubCutaneous every 12 hours  ibuprofen  Tablet. 600 milliGRAM(s) Oral every 6 hours  oxytocin Infusion 333.333 milliUNIT(s)/Min (1000 mL/Hr) IV Continuous <Continuous>      MEDICATIONS  (PRN):  albuterol    90 MICROgram(s) HFA Inhaler 2 Puff(s) Inhalation every 6 hours PRN Bronchospasm  diphenhydrAMINE 25 milliGRAM(s) Oral every 6 hours PRN Pruritus  lanolin Ointment 1 Application(s) Topical every 6 hours PRN Sore Nipples  magnesium hydroxide Suspension 30 milliLiter(s) Oral two times a day PRN Constipation  oxyCODONE    IR 5 milliGRAM(s) Oral every 3 hours PRN Moderate to Severe Pain (4-10)  oxyCODONE    IR 5 milliGRAM(s) Oral once PRN Moderate to Severe Pain (4-10)  simethicone 80 milliGRAM(s) Chew every 4 hours PRN Gas      Labs:  Blood type: O Positive  Rubella IgG: RPR: Negative                          10.0<L>   16.52<H> >-----------< 205    ( 08-22 @ 06:15 )             30.0<L>                        12.7   11.78<H> >-----------< 259    ( 08-21 @ 07:35 )             38.0    08-21-24 @ 07:55      140  |  103  |  12  ----------------------------<  105<H>  4.4   |  19<L>  |  0.62        Ca    10.2      21 Aug 2024 07:55    TPro  6.7  /  Alb  3.4  /  TBili  <0.2  /  DBili  x   /  AST  26  /  ALT  27  /  AlkPhos  134<H>  08-21-24 @ 07:55    PE:  General: NAD  Abdomen: Soft, appropriately tender  Incision: C/D/I with Aquacel dressing; verbal and written instructions given to patient about the removal of same   Lochia: WNL  Extremities: No calf tenderness/erythema. Mild b/l pedal edema    A/P: 32yo POD#2 s/p VA- rLTCS c/b PEC w/o s/f. Blood pressures are currently controlled w/o medication. PCR 0.6, HELLP wnl. Denies s/s of PEC. PMH of anal fissure repaired in 2013. Patient is a current cigarette smoker and has been since 13yo and declined smoking cessation aids. She was evaluated by [facility]. , H/H 12.7/38->10/30.  Patient is stable and doing well post-operatively.      #PEC w/o s/f  -Blood pressures controlled w/o medication  -PCR 0.6, HELLP wnl  -Denies s/s of PEC  -B/P monitor sent to [facility]  -instructions given on BP monitoring; TID; call MD office if greater than 140/90; report to emergency room is greater than 160/110  -reviewed signs and symptoms related to preeclampsia with patient such as HA, Change in vision, N/V. RUQ pain   -keep log BP's to present to MD during appointment for BP check in 3 to 5 days  -All questions answered, patient verbalized understanding     #Current smoker  -Declined smoking cessation aids  -Evaluated by SW  -Educated on the effects of second hand smoking on infant and others    #Post-Partum  - Continue regular diet.  - Encouraged use of abdominal binder.  - Increase ambulation; SCDs when not ambulating  - Continue Motrin, Tylenol, oxycodone PRN for pain control.    - D/C plan-cleared for early d/c by Dr Carmen Ibarra, BERNARD-BC

## 2024-08-23 NOTE — PROGRESS NOTE ADULT - NS ATTEND AMEND GEN_ALL_CORE FT
Patient seen and examined and history and BP reviewed.  Patient was admitted with preeclampsia and symptoms resolving, BP is controlled.  Patient adamant about going home  Will discharge her home as BPs have improved

## 2024-08-26 ENCOUNTER — NON-APPOINTMENT (OUTPATIENT)
Age: 34
End: 2024-08-26

## 2024-08-26 PROBLEM — F17.200 NICOTINE DEPENDENCE, UNSPECIFIED, UNCOMPLICATED: Chronic | Status: ACTIVE | Noted: 2024-08-21

## 2024-09-06 ENCOUNTER — APPOINTMENT (OUTPATIENT)
Dept: OBGYN | Facility: CLINIC | Age: 34
End: 2024-09-06
Payer: MEDICAID

## 2024-09-06 VITALS
DIASTOLIC BLOOD PRESSURE: 82 MMHG | WEIGHT: 191 LBS | SYSTOLIC BLOOD PRESSURE: 122 MMHG | HEIGHT: 61 IN | BODY MASS INDEX: 36.06 KG/M2

## 2024-09-06 DIAGNOSIS — Z3A.13 13 WEEKS GESTATION OF PREGNANCY: ICD-10-CM

## 2024-09-06 DIAGNOSIS — Z3A.38 38 WEEKS GESTATION OF PREGNANCY: ICD-10-CM

## 2024-09-06 DIAGNOSIS — Z3A.17 17 WEEKS GESTATION OF PREGNANCY: ICD-10-CM

## 2024-09-06 DIAGNOSIS — O99.210 OBESITY COMPLICATING PREGNANCY, UNSPECIFIED TRIMESTER: ICD-10-CM

## 2024-09-06 DIAGNOSIS — O36.80X0 PREGNANCY WITH INCONCLUSIVE FETAL VIABILITY, NOT APPLICABLE OR UNSPECIFIED: ICD-10-CM

## 2024-09-06 DIAGNOSIS — Z87.59 PERSONAL HISTORY OF OTHER COMPLICATIONS OF PREGNANCY, CHILDBIRTH AND THE PUERPERIUM: ICD-10-CM

## 2024-09-06 DIAGNOSIS — Z3A.10 10 WEEKS GESTATION OF PREGNANCY: ICD-10-CM

## 2024-09-06 DIAGNOSIS — E66.01 MORBID (SEVERE) OBESITY DUE TO EXCESS CALORIES: ICD-10-CM

## 2024-09-06 DIAGNOSIS — Z3A.36 36 WEEKS GESTATION OF PREGNANCY: ICD-10-CM

## 2024-09-06 DIAGNOSIS — Z3A.25 25 WEEKS GESTATION OF PREGNANCY: ICD-10-CM

## 2024-09-06 DIAGNOSIS — O14.10 SEVERE PRE-ECLAMPSIA, UNSPECIFIED TRIMESTER: ICD-10-CM

## 2024-09-06 DIAGNOSIS — Z3A.34 34 WEEKS GESTATION OF PREGNANCY: ICD-10-CM

## 2024-09-06 DIAGNOSIS — Z92.89 PERSONAL HISTORY OF OTHER MEDICAL TREATMENT: ICD-10-CM

## 2024-09-06 DIAGNOSIS — Z3A.37 37 WEEKS GESTATION OF PREGNANCY: ICD-10-CM

## 2024-09-06 PROCEDURE — 0503F POSTPARTUM CARE VISIT: CPT

## 2024-09-08 PROBLEM — O99.210 OBESITY COMPLICATING PREGNANCY, CHILDBIRTH, OR PUERPERIUM, ANTEPARTUM: Status: RESOLVED | Noted: 2024-03-22 | Resolved: 2024-09-08

## 2024-09-08 PROBLEM — Z87.59 HISTORY OF THREATENED ABORTION: Status: RESOLVED | Noted: 2024-02-23 | Resolved: 2024-09-08

## 2024-09-08 PROBLEM — Z3A.37 37 WEEKS GESTATION OF PREGNANCY: Status: RESOLVED | Noted: 2024-08-09 | Resolved: 2024-09-08

## 2024-09-08 PROBLEM — Z3A.13 13 WEEKS GESTATION OF PREGNANCY: Status: RESOLVED | Noted: 2024-02-23 | Resolved: 2024-09-08

## 2024-09-08 PROBLEM — E66.01 MORBID OBESITY WITH BMI OF 40.0-44.9, ADULT: Status: RESOLVED | Noted: 2024-03-22 | Resolved: 2024-09-08

## 2024-09-08 PROBLEM — Z92.89 HISTORY OF SERUM AFP TEST: Status: RESOLVED | Noted: 2024-03-22 | Resolved: 2024-09-08

## 2024-09-08 PROBLEM — Z3A.10 10 WEEKS GESTATION OF PREGNANCY: Status: RESOLVED | Noted: 2024-02-02 | Resolved: 2024-09-08

## 2024-09-08 PROBLEM — O36.80X0 ENCOUNTER TO DETERMINE FETAL VIABILITY OF PREGNANCY: Status: RESOLVED | Noted: 2024-02-02 | Resolved: 2024-09-08

## 2024-09-08 PROBLEM — Z3A.38 38 WEEKS GESTATION OF PREGNANCY: Status: RESOLVED | Noted: 2024-08-16 | Resolved: 2024-09-08

## 2024-09-08 PROBLEM — Z3A.36 36 WEEKS GESTATION OF PREGNANCY: Status: RESOLVED | Noted: 2024-08-02 | Resolved: 2024-09-08

## 2024-09-08 PROBLEM — Z3A.17 17 WEEKS GESTATION OF PREGNANCY: Status: RESOLVED | Noted: 2024-03-22 | Resolved: 2024-09-08

## 2024-09-08 PROBLEM — Z3A.25 25 WEEKS GESTATION OF PREGNANCY: Status: RESOLVED | Noted: 2024-05-14 | Resolved: 2024-09-08

## 2024-09-08 PROBLEM — Z3A.34 34 WEEKS GESTATION OF PREGNANCY: Status: RESOLVED | Noted: 2024-07-19 | Resolved: 2024-09-08

## 2024-09-08 NOTE — HISTORY OF PRESENT ILLNESS
[Postpartum Follow Up] : postpartum follow up [Complications:___] : complications include: [unfilled] [Repeat C/S] : delivered by  section (repeat) [Female] : Delivery History: baby girl [Wt. ___] : weighing [unfilled] [Breastfeeding] : not currently nursing [S/Sx PP Depression] : no signs/symptoms of postpartum depression [None] : No associated symptoms are reported [Clean/Dry/Intact] : clean, dry and intact [Erythema] : not erythematous [Back to Normal] : is back to normal in size [Mild] : mild vaginal bleeding [Normal] : the vagina was normal [Cervix Sample Taken] : cervical sample not taken for a Pap smear [Not Done] : Examination of breasts not done [Doing Well] : is doing well [No Sign of Infection] : is showing no signs of infection [Excellent Pain Control] : has excellent pain control [No [place]] : to avoid sexual intercourse [Limited ADLs] : to participate in activities of daily living with limitations [Limited Work] : to work with limitations [Limited Housework] : to do housework with limitations [Limited Sports___] : to participate in sports with limitations~U: [unfilled] [de-identified] : 39 weeks

## 2024-10-11 ENCOUNTER — APPOINTMENT (OUTPATIENT)
Dept: OBGYN | Facility: CLINIC | Age: 34
End: 2024-10-11

## 2024-10-22 ENCOUNTER — APPOINTMENT (OUTPATIENT)
Dept: OBGYN | Facility: CLINIC | Age: 34
End: 2024-10-22
Payer: MEDICAID

## 2024-10-22 VITALS
BODY MASS INDEX: 37.19 KG/M2 | SYSTOLIC BLOOD PRESSURE: 122 MMHG | WEIGHT: 197 LBS | DIASTOLIC BLOOD PRESSURE: 84 MMHG | HEIGHT: 61 IN

## 2024-10-22 DIAGNOSIS — Z87.59 PERSONAL HISTORY OF OTHER COMPLICATIONS OF PREGNANCY, CHILDBIRTH AND THE PUERPERIUM: ICD-10-CM

## 2024-10-22 DIAGNOSIS — O34.219 MATERNAL CARE FOR UNSPECIFIED TYPE SCAR FROM PREVIOUS CESAREAN DELIVERY: ICD-10-CM

## 2024-10-22 DIAGNOSIS — Z01.419 ENCOUNTER FOR GYNECOLOGICAL EXAMINATION (GENERAL) (ROUTINE) W/OUT ABNORMAL FINDINGS: ICD-10-CM

## 2024-10-22 DIAGNOSIS — Z30.09 ENCOUNTER FOR OTHER GENERAL COUNSELING AND ADVICE ON CONTRACEPTION: ICD-10-CM

## 2024-10-22 DIAGNOSIS — Z23 ENCOUNTER FOR IMMUNIZATION: ICD-10-CM

## 2024-10-22 DIAGNOSIS — N87.0 MILD CERVICAL DYSPLASIA: ICD-10-CM

## 2024-10-22 DIAGNOSIS — J45.909 UNSPECIFIED ASTHMA, UNCOMPLICATED: ICD-10-CM

## 2024-10-22 PROCEDURE — 99213 OFFICE O/P EST LOW 20 MIN: CPT | Mod: 25

## 2024-10-22 PROCEDURE — 90471 IMMUNIZATION ADMIN: CPT

## 2024-10-22 PROCEDURE — 90715 TDAP VACCINE 7 YRS/> IM: CPT

## 2024-10-22 PROCEDURE — 0503F POSTPARTUM CARE VISIT: CPT

## 2025-01-21 ENCOUNTER — APPOINTMENT (OUTPATIENT)
Dept: OBGYN | Facility: CLINIC | Age: 35
End: 2025-01-21

## 2025-04-09 ENCOUNTER — RX RENEWAL (OUTPATIENT)
Age: 35
End: 2025-04-09

## 2025-04-10 ENCOUNTER — OFFICE (OUTPATIENT)
Dept: URBAN - METROPOLITAN AREA CLINIC 105 | Facility: CLINIC | Age: 35
Setting detail: OPHTHALMOLOGY
End: 2025-04-10
Payer: MEDICAID

## 2025-04-10 DIAGNOSIS — H00.12: ICD-10-CM

## 2025-04-10 DIAGNOSIS — H00.11: ICD-10-CM

## 2025-04-10 DIAGNOSIS — H00.15: ICD-10-CM

## 2025-04-10 DIAGNOSIS — H00.14: ICD-10-CM

## 2025-04-10 PROBLEM — H01.001 BLEPHARITIS; RIGHT UPPER LID, LEFT UPPER LID: Status: ACTIVE | Noted: 2025-04-10

## 2025-04-10 PROBLEM — H01.004 BLEPHARITIS; RIGHT UPPER LID, LEFT UPPER LID: Status: ACTIVE | Noted: 2025-04-10

## 2025-04-10 PROCEDURE — 99203 OFFICE O/P NEW LOW 30 MIN: CPT | Performed by: REGISTERED NURSE

## 2025-04-10 ASSESSMENT — KERATOMETRY
OS_K2POWER_DIOPTERS: 44.00
OS_K1POWER_DIOPTERS: 43.75
OD_AXISANGLE_DEGREES: 090
OD_K1POWER_DIOPTERS: 44.00
OS_AXISANGLE_DEGREES: 082
OD_K2POWER_DIOPTERS: 44.00

## 2025-04-10 ASSESSMENT — TONOMETRY: OS_IOP_MMHG: 20

## 2025-04-10 ASSESSMENT — CONFRONTATIONAL VISUAL FIELD TEST (CVF)
OS_FINDINGS: FULL
OD_FINDINGS: FULL

## 2025-04-10 ASSESSMENT — REFRACTION_AUTOREFRACTION
OS_AXIS: 070
OD_SPHERE: -0.50
OS_CYLINDER: -0.75
OD_AXIS: 082
OS_SPHERE: -0.50
OD_CYLINDER: -0.75

## 2025-04-10 ASSESSMENT — VISUAL ACUITY
OS_BCVA: 20/30-2
OD_BCVA: 20/20-

## 2025-04-10 ASSESSMENT — LID EXAM ASSESSMENTS
OD_BLEPHARITIS: RUL 2+
OS_BLEPHARITIS: LUL 2+

## 2025-04-18 ENCOUNTER — APPOINTMENT (OUTPATIENT)
Dept: OBGYN | Facility: CLINIC | Age: 35
End: 2025-04-18

## 2025-08-27 ENCOUNTER — NON-APPOINTMENT (OUTPATIENT)
Age: 35
End: 2025-08-27

## 2025-08-29 ENCOUNTER — APPOINTMENT (OUTPATIENT)
Dept: OBGYN | Facility: CLINIC | Age: 35
End: 2025-08-29

## (undated) DEVICE — DRSG DERMABOND PRINEO 22CM